# Patient Record
Sex: FEMALE | Race: WHITE | NOT HISPANIC OR LATINO
[De-identification: names, ages, dates, MRNs, and addresses within clinical notes are randomized per-mention and may not be internally consistent; named-entity substitution may affect disease eponyms.]

---

## 2018-06-12 ENCOUNTER — RESULT REVIEW (OUTPATIENT)
Age: 53
End: 2018-06-12

## 2019-06-18 ENCOUNTER — RESULT REVIEW (OUTPATIENT)
Age: 54
End: 2019-06-18

## 2020-06-19 ENCOUNTER — RESULT REVIEW (OUTPATIENT)
Age: 55
End: 2020-06-19

## 2020-10-12 ENCOUNTER — OUTPATIENT (OUTPATIENT)
Dept: OUTPATIENT SERVICES | Facility: HOSPITAL | Age: 55
LOS: 1 days | End: 2020-10-12
Payer: COMMERCIAL

## 2020-10-12 DIAGNOSIS — Z01.818 ENCOUNTER FOR OTHER PREPROCEDURAL EXAMINATION: ICD-10-CM

## 2020-10-12 PROBLEM — Z00.00 ENCOUNTER FOR PREVENTIVE HEALTH EXAMINATION: Status: ACTIVE | Noted: 2020-10-12

## 2020-10-12 LAB
ALBUMIN SERPL ELPH-MCNC: 4.2 G/DL — SIGNIFICANT CHANGE UP (ref 3.3–5.2)
ALP SERPL-CCNC: 67 U/L — SIGNIFICANT CHANGE UP (ref 40–120)
ALT FLD-CCNC: 13 U/L — SIGNIFICANT CHANGE UP
ANION GAP SERPL CALC-SCNC: 10 MMOL/L — SIGNIFICANT CHANGE UP (ref 5–17)
APTT BLD: 35.2 SEC — SIGNIFICANT CHANGE UP (ref 27.5–35.5)
AST SERPL-CCNC: 19 U/L — SIGNIFICANT CHANGE UP
BASOPHILS # BLD AUTO: 0.05 K/UL — SIGNIFICANT CHANGE UP (ref 0–0.2)
BASOPHILS NFR BLD AUTO: 0.6 % — SIGNIFICANT CHANGE UP (ref 0–2)
BILIRUB SERPL-MCNC: <0.2 MG/DL — LOW (ref 0.4–2)
BUN SERPL-MCNC: 15 MG/DL — SIGNIFICANT CHANGE UP (ref 8–20)
CALCIUM SERPL-MCNC: 9.6 MG/DL — SIGNIFICANT CHANGE UP (ref 8.6–10.2)
CHLORIDE SERPL-SCNC: 99 MMOL/L — SIGNIFICANT CHANGE UP (ref 98–107)
CO2 SERPL-SCNC: 27 MMOL/L — SIGNIFICANT CHANGE UP (ref 22–29)
CREAT SERPL-MCNC: 0.79 MG/DL — SIGNIFICANT CHANGE UP (ref 0.5–1.3)
EOSINOPHIL # BLD AUTO: 0.12 K/UL — SIGNIFICANT CHANGE UP (ref 0–0.5)
EOSINOPHIL NFR BLD AUTO: 1.5 % — SIGNIFICANT CHANGE UP (ref 0–6)
GLUCOSE SERPL-MCNC: 101 MG/DL — HIGH (ref 70–99)
HCT VFR BLD CALC: 42 % — SIGNIFICANT CHANGE UP (ref 34.5–45)
HGB BLD-MCNC: 12.5 G/DL — SIGNIFICANT CHANGE UP (ref 11.5–15.5)
IMM GRANULOCYTES NFR BLD AUTO: 0.2 % — SIGNIFICANT CHANGE UP (ref 0–1.5)
INR BLD: 1.15 RATIO — SIGNIFICANT CHANGE UP (ref 0.88–1.16)
LYMPHOCYTES # BLD AUTO: 1.8 K/UL — SIGNIFICANT CHANGE UP (ref 1–3.3)
LYMPHOCYTES # BLD AUTO: 22 % — SIGNIFICANT CHANGE UP (ref 13–44)
MCHC RBC-ENTMCNC: 23.2 PG — LOW (ref 27–34)
MCHC RBC-ENTMCNC: 29.8 GM/DL — LOW (ref 32–36)
MCV RBC AUTO: 78.1 FL — LOW (ref 80–100)
MONOCYTES # BLD AUTO: 0.5 K/UL — SIGNIFICANT CHANGE UP (ref 0–0.9)
MONOCYTES NFR BLD AUTO: 6.1 % — SIGNIFICANT CHANGE UP (ref 2–14)
NEUTROPHILS # BLD AUTO: 5.68 K/UL — SIGNIFICANT CHANGE UP (ref 1.8–7.4)
NEUTROPHILS NFR BLD AUTO: 69.6 % — SIGNIFICANT CHANGE UP (ref 43–77)
PLATELET # BLD AUTO: 280 K/UL — SIGNIFICANT CHANGE UP (ref 150–400)
POTASSIUM SERPL-MCNC: 4.3 MMOL/L — SIGNIFICANT CHANGE UP (ref 3.5–5.3)
POTASSIUM SERPL-SCNC: 4.3 MMOL/L — SIGNIFICANT CHANGE UP (ref 3.5–5.3)
PROT SERPL-MCNC: 7.9 G/DL — SIGNIFICANT CHANGE UP (ref 6.6–8.7)
PROTHROM AB SERPL-ACNC: 13.3 SEC — SIGNIFICANT CHANGE UP (ref 10.6–13.6)
RBC # BLD: 5.38 M/UL — HIGH (ref 3.8–5.2)
RBC # FLD: 14.9 % — HIGH (ref 10.3–14.5)
SODIUM SERPL-SCNC: 136 MMOL/L — SIGNIFICANT CHANGE UP (ref 135–145)
WBC # BLD: 8.17 K/UL — SIGNIFICANT CHANGE UP (ref 3.8–10.5)
WBC # FLD AUTO: 8.17 K/UL — SIGNIFICANT CHANGE UP (ref 3.8–10.5)

## 2020-10-12 PROCEDURE — 93005 ELECTROCARDIOGRAM TRACING: CPT

## 2020-10-12 PROCEDURE — 85610 PROTHROMBIN TIME: CPT

## 2020-10-12 PROCEDURE — 80053 COMPREHEN METABOLIC PANEL: CPT

## 2020-10-12 PROCEDURE — G0463: CPT

## 2020-10-12 PROCEDURE — 36415 COLL VENOUS BLD VENIPUNCTURE: CPT

## 2020-10-12 PROCEDURE — 85025 COMPLETE CBC W/AUTO DIFF WBC: CPT

## 2020-10-12 PROCEDURE — 85730 THROMBOPLASTIN TIME PARTIAL: CPT

## 2020-10-12 PROCEDURE — 93010 ELECTROCARDIOGRAM REPORT: CPT

## 2020-10-17 DIAGNOSIS — Z01.818 ENCOUNTER FOR OTHER PREPROCEDURAL EXAMINATION: ICD-10-CM

## 2020-10-19 ENCOUNTER — APPOINTMENT (OUTPATIENT)
Dept: DISASTER EMERGENCY | Facility: CLINIC | Age: 55
End: 2020-10-19

## 2020-10-20 LAB — SARS-COV-2 N GENE NPH QL NAA+PROBE: NOT DETECTED

## 2020-10-23 ENCOUNTER — APPOINTMENT (OUTPATIENT)
Dept: DISASTER EMERGENCY | Facility: CLINIC | Age: 55
End: 2020-10-23

## 2020-10-24 LAB — SARS-COV-2 N GENE NPH QL NAA+PROBE: NOT DETECTED

## 2021-07-02 ENCOUNTER — RESULT REVIEW (OUTPATIENT)
Age: 56
End: 2021-07-02

## 2021-12-14 ENCOUNTER — RESULT REVIEW (OUTPATIENT)
Age: 56
End: 2021-12-14

## 2022-01-11 ENCOUNTER — NON-APPOINTMENT (OUTPATIENT)
Age: 57
End: 2022-01-11

## 2022-01-12 ENCOUNTER — FORM ENCOUNTER (OUTPATIENT)
Age: 57
End: 2022-01-12

## 2022-01-13 ENCOUNTER — NON-APPOINTMENT (OUTPATIENT)
Age: 57
End: 2022-01-13

## 2022-01-13 ENCOUNTER — APPOINTMENT (OUTPATIENT)
Dept: GYNECOLOGIC ONCOLOGY | Facility: CLINIC | Age: 57
End: 2022-01-13
Payer: COMMERCIAL

## 2022-01-13 PROCEDURE — 76830 TRANSVAGINAL US NON-OB: CPT | Mod: 59

## 2022-01-13 PROCEDURE — 99205 OFFICE O/P NEW HI 60 MIN: CPT | Mod: 25

## 2022-01-13 PROCEDURE — 76857 US EXAM PELVIC LIMITED: CPT | Mod: 59

## 2022-01-18 NOTE — HISTORY OF PRESENT ILLNESS
[FreeTextEntry1] : 57yo  LMP unknown. Patient reports irregular bleeding over the past year. She underwent EMC on 21 with Dr. Lula Horne with pathology report revealing endometrial adenocarcinoma FIGO2 vs. high grade. She denies any pelvic pain or issues with bowel/bladder habits. \par \par LPAP- 2021, normal\par LMammo- , reported normal\par LColonoscopy- never\par LBone Density Scan- never\par

## 2022-01-18 NOTE — PHYSICAL EXAM
[Normal] : Bimanual Exam: Normal [de-identified] : Patient was interviewed and examined with chaperone present. Name of Chaperone: Rubina Haji PA-C

## 2022-01-18 NOTE — END OF VISIT
[FreeTextEntry3] : Written by Krystal Haji PA-C, acting as a scribe for Dr. Canelo Horne.\par  [FreeTextEntry2] : This note accurately reflects the work and decisions made by me.\par

## 2022-01-18 NOTE — ASSESSMENT
[FreeTextEntry1] : 55yo with endometrial adenocarcinoma.\par \par I discussed at length with the patient the nature, purpose, risks, benefits, and alternatives of Robot assisted total laparoscopic hysterectomy with bilateral salpingo-oophorectomy sentinel lymph node mapping cystoscopy and possible retroperitoneal lymphadenectomy and staging (including possible omentectomy).  The patient understands the risks to include,but not be limited to, bowel injury, bleeding (with the possible need for transfusion), bladder or ureteral injury, infections, deep venous thrombosis, and srikanth-operative death.  The patient understands the utility of intraoperative frozen section to determine whether surgical staging will be performed. The patient also understands that her surgery may not be able to be performed robotically and that she may need a laparotomy.  She also understands the limitations of robotic surgery and the possibility of missing a surgical complication with need for subsequent re-exploration.  She agrees to proceed.  She asked numerous questions which were answered to her satisfaction.  She understands the need for a pre-operative bowel preparation and agrees to comply with our instructions.  She also understands the rationale for a cystoscopy at the completion of the procedure and the potential risks of cystoscopy.  The patient also understands the possible limitations of frozen section and the limitations of robotic staging compared to a laparotomy approach.\par \par \par \par

## 2022-01-18 NOTE — CHIEF COMPLAINT
[FreeTextEntry1] : [facility]\par \par [facility] [phone] at [address] [place] [id number]\par

## 2022-01-18 NOTE — LETTER BODY
[FreeTextEntry2] : Name: SHANIQUA JUAREZ \par : 1965 \par \par Date of Visit: 2022 \par \par Dear Dr. Lula Horne\par \par I recently saw our mutual patient, SHANIQUA JUAREZ , in my office.\par \par Below, please see my findings.\par \par As always, it is my sincere pleasure to have the opportunity to participate in one of your patients' care. Please feel free to contact me with any questions or concerns that you may have regarding her care.\par \par Sincerely yours,\par \par Canelo Horne MD, FACOG, FACS\par

## 2022-01-19 ENCOUNTER — RESULT REVIEW (OUTPATIENT)
Age: 57
End: 2022-01-19

## 2022-01-19 ENCOUNTER — OUTPATIENT (OUTPATIENT)
Dept: OUTPATIENT SERVICES | Facility: HOSPITAL | Age: 57
LOS: 1 days | End: 2022-01-19
Payer: COMMERCIAL

## 2022-01-19 DIAGNOSIS — C54.1 MALIGNANT NEOPLASM OF ENDOMETRIUM: ICD-10-CM

## 2022-01-19 PROCEDURE — 88321 CONSLTJ&REPRT SLD PREP ELSWR: CPT

## 2022-01-20 ENCOUNTER — OUTPATIENT (OUTPATIENT)
Dept: OUTPATIENT SERVICES | Facility: HOSPITAL | Age: 57
LOS: 1 days | End: 2022-01-20
Payer: COMMERCIAL

## 2022-01-20 VITALS
OXYGEN SATURATION: 99 % | WEIGHT: 172.4 LBS | HEIGHT: 64 IN | TEMPERATURE: 97 F | SYSTOLIC BLOOD PRESSURE: 120 MMHG | RESPIRATION RATE: 18 BRPM | DIASTOLIC BLOOD PRESSURE: 80 MMHG | HEART RATE: 70 BPM

## 2022-01-20 DIAGNOSIS — C54.1 MALIGNANT NEOPLASM OF ENDOMETRIUM: ICD-10-CM

## 2022-01-20 DIAGNOSIS — Z29.9 ENCOUNTER FOR PROPHYLACTIC MEASURES, UNSPECIFIED: ICD-10-CM

## 2022-01-20 DIAGNOSIS — Z01.818 ENCOUNTER FOR OTHER PREPROCEDURAL EXAMINATION: ICD-10-CM

## 2022-01-20 DIAGNOSIS — Z98.890 OTHER SPECIFIED POSTPROCEDURAL STATES: Chronic | ICD-10-CM

## 2022-01-20 DIAGNOSIS — I44.7 LEFT BUNDLE-BRANCH BLOCK, UNSPECIFIED: ICD-10-CM

## 2022-01-20 LAB
A1C WITH ESTIMATED AVERAGE GLUCOSE RESULT: 5.6 % — SIGNIFICANT CHANGE UP (ref 4–5.6)
ANION GAP SERPL CALC-SCNC: 9 MMOL/L — SIGNIFICANT CHANGE UP (ref 5–17)
APTT BLD: 34.4 SEC — SIGNIFICANT CHANGE UP (ref 27.5–35.5)
BASOPHILS # BLD AUTO: 0.04 K/UL — SIGNIFICANT CHANGE UP (ref 0–0.2)
BASOPHILS NFR BLD AUTO: 0.7 % — SIGNIFICANT CHANGE UP (ref 0–2)
BLD GP AB SCN SERPL QL: SIGNIFICANT CHANGE UP
BUN SERPL-MCNC: 17.8 MG/DL — SIGNIFICANT CHANGE UP (ref 8–20)
CALCIUM SERPL-MCNC: 9.4 MG/DL — SIGNIFICANT CHANGE UP (ref 8.6–10.2)
CANCER AG125 SERPL-ACNC: 34 U/ML — SIGNIFICANT CHANGE UP
CHLORIDE SERPL-SCNC: 101 MMOL/L — SIGNIFICANT CHANGE UP (ref 98–107)
CO2 SERPL-SCNC: 26 MMOL/L — SIGNIFICANT CHANGE UP (ref 22–29)
CREAT SERPL-MCNC: 0.68 MG/DL — SIGNIFICANT CHANGE UP (ref 0.5–1.3)
EOSINOPHIL # BLD AUTO: 0.1 K/UL — SIGNIFICANT CHANGE UP (ref 0–0.5)
EOSINOPHIL NFR BLD AUTO: 1.7 % — SIGNIFICANT CHANGE UP (ref 0–6)
ESTIMATED AVERAGE GLUCOSE: 114 MG/DL — SIGNIFICANT CHANGE UP (ref 68–114)
GLUCOSE SERPL-MCNC: 105 MG/DL — HIGH (ref 70–99)
HCG SERPL-ACNC: <4 MIU/ML — SIGNIFICANT CHANGE UP
HCT VFR BLD CALC: 42.8 % — SIGNIFICANT CHANGE UP (ref 34.5–45)
HGB BLD-MCNC: 13.4 G/DL — SIGNIFICANT CHANGE UP (ref 11.5–15.5)
IMM GRANULOCYTES NFR BLD AUTO: 0.3 % — SIGNIFICANT CHANGE UP (ref 0–1.5)
INR BLD: 1.15 RATIO — SIGNIFICANT CHANGE UP (ref 0.88–1.16)
LYMPHOCYTES # BLD AUTO: 1.83 K/UL — SIGNIFICANT CHANGE UP (ref 1–3.3)
LYMPHOCYTES # BLD AUTO: 31.5 % — SIGNIFICANT CHANGE UP (ref 13–44)
MCHC RBC-ENTMCNC: 25.4 PG — LOW (ref 27–34)
MCHC RBC-ENTMCNC: 31.3 GM/DL — LOW (ref 32–36)
MCV RBC AUTO: 81.2 FL — SIGNIFICANT CHANGE UP (ref 80–100)
MONOCYTES # BLD AUTO: 0.41 K/UL — SIGNIFICANT CHANGE UP (ref 0–0.9)
MONOCYTES NFR BLD AUTO: 7.1 % — SIGNIFICANT CHANGE UP (ref 2–14)
NEUTROPHILS # BLD AUTO: 3.41 K/UL — SIGNIFICANT CHANGE UP (ref 1.8–7.4)
NEUTROPHILS NFR BLD AUTO: 58.7 % — SIGNIFICANT CHANGE UP (ref 43–77)
PLATELET # BLD AUTO: 271 K/UL — SIGNIFICANT CHANGE UP (ref 150–400)
POTASSIUM SERPL-MCNC: 4.8 MMOL/L — SIGNIFICANT CHANGE UP (ref 3.5–5.3)
POTASSIUM SERPL-SCNC: 4.8 MMOL/L — SIGNIFICANT CHANGE UP (ref 3.5–5.3)
PROTHROM AB SERPL-ACNC: 13.3 SEC — SIGNIFICANT CHANGE UP (ref 10.6–13.6)
RBC # BLD: 5.27 M/UL — HIGH (ref 3.8–5.2)
RBC # FLD: 14.6 % — HIGH (ref 10.3–14.5)
SARS-COV-2 RNA SPEC QL NAA+PROBE: DETECTED
SODIUM SERPL-SCNC: 136 MMOL/L — SIGNIFICANT CHANGE UP (ref 135–145)
SURGICAL PATHOLOGY STUDY: SIGNIFICANT CHANGE UP
WBC # BLD: 5.81 K/UL — SIGNIFICANT CHANGE UP (ref 3.8–10.5)
WBC # FLD AUTO: 5.81 K/UL — SIGNIFICANT CHANGE UP (ref 3.8–10.5)

## 2022-01-20 PROCEDURE — 93010 ELECTROCARDIOGRAM REPORT: CPT

## 2022-01-20 PROCEDURE — 93005 ELECTROCARDIOGRAM TRACING: CPT

## 2022-01-20 PROCEDURE — G0463: CPT

## 2022-01-20 RX ORDER — SODIUM CHLORIDE 9 MG/ML
3 INJECTION INTRAMUSCULAR; INTRAVENOUS; SUBCUTANEOUS ONCE
Refills: 0 | Status: DISCONTINUED | OUTPATIENT
Start: 2022-02-08 | End: 2022-02-08

## 2022-01-20 RX ORDER — METRONIDAZOLE 500 MG
500 TABLET ORAL ONCE
Refills: 0 | Status: COMPLETED | OUTPATIENT
Start: 2022-02-08 | End: 2022-02-08

## 2022-01-20 RX ORDER — ACETAMINOPHEN 500 MG
975 TABLET ORAL ONCE
Refills: 0 | Status: COMPLETED | OUTPATIENT
Start: 2022-02-08 | End: 2022-02-08

## 2022-01-20 RX ORDER — CEFAZOLIN SODIUM 1 G
2000 VIAL (EA) INJECTION ONCE
Refills: 0 | Status: COMPLETED | OUTPATIENT
Start: 2022-02-08 | End: 2022-02-08

## 2022-01-20 NOTE — H&P PST ADULT - ASSESSMENT
CAPRINI SCORE    AGE RELATED RISK FACTORS                                                             [x ] Age 41-60 years                                            (1 Point)  [ ] Age: 61-74 years                                           (2 Points)                 [ ] Age= 75 years                                                (3 Points)             DISEASE RELATED RISK FACTORS                                                       [ ] Edema in the lower extremities                 (1 Point)                     [ ] Varicose veins                                               (1 Point)                                 [x ] BMI > 25 Kg/m2                                            (1 Point)                                  [ ] Serious infection (ie PNA)                            (1 Point)                     [ ] Lung disease ( COPD, Emphysema)            (1 Point)                                                                          [ ] Acute myocardial infarction                         (1 Point)                  [ ] Congestive heart failure (in the previous month)  (1 Point)         [ ] Inflammatory bowel disease                            (1 Point)                  [ ] Central venous access, PICC or Port               (2 points)       (within the last month)                                                                [ ] Stroke (in the previous month)                        (5 Points)    [ x] Previous or present malignancy                       (2 points)                                                                                                                                                         HEMATOLOGY RELATED FACTORS                                                         [ ] Prior episodes of VTE                                     (3 Points)                     [x ] Positive family history for VTE                      (3 Points)                  [ ] Prothrombin 76190 A                                     (3 Points)                     [ ] Factor V Leiden                                                (3 Points)                        [ ] Lupus anticoagulants                                      (3 Points)                                                           [ ] Anticardiolipin antibodies                              (3 Points)                                                       [ ] High homocysteine in the blood                   (3 Points)                                             [ ] Other congenital or acquired thrombophilia      (3 Points)                                                [ ] Heparin induced thrombocytopenia                  (3 Points)                                        MOBILITY RELATED FACTORS  [ ] Bed rest                                                         (1 Point)  [ ] Plaster cast                                                    (2 points)  [ ] Bed bound for more than 72 hours           (2 Points)    GENDER SPECIFIC FACTORS  [ ] Pregnancy or had a baby within the last month   (1 Point)  [ ] Post-partum < 6 weeks                                   (1 Point)  [ ] Hormonal therapy  or oral contraception   (1 Point)  [ ] History of pregnancy complications              (1 point)  [ ] Unexplained or recurrent              (1 Point)    OTHER RISK FACTORS                                           (1 Point)  [ ] BMI >40, smoking, diabetes requiring insulin, chemotherapy  blood transfusions and length of surgery over 2 hours    SURGERY RELATED RISK FACTORS  [ ]  Section within the last month     (1 Point)  [ ] Minor surgery                                                  (1 Point)  [ ] Arthroscopic surgery                                       (2 Points)  [ x] Planned major surgery lasting more            (2 Points)      than 45 minutes     [ ] Elective hip or knee joint replacement       (5 points)       surgery                                                TRAUMA RELATED RISK FACTORS  [ ] Fracture of the hip, pelvis, or leg                       (5 Points)  [ ] Spinal cord injury resulting in paralysis             (5 points)       (in the previous month)    [ ] Paralysis  (less than 1 month)                             (5 Points)  [ ] Multiple Trauma within 1 month                        (5 Points)    Total Score [  9     ]    Caprini Score 0-2: Low Risk, NO VTE prophylaxis required for most patients, encourage ambulation  Caprini Score 3-6: Moderate Risk , pharmacologic VTE prophylaxis is indicated for most patients (in the absence of contraindications)  Caprini Score Greater than or =7: High risk, pharmocologic VTE prophylaxis indicated for most patients (in the absence of contraindications)              OPIOID RISK TOOL    JEOVANNY EACH BOX THAT APPLIES AND ADD TOTALS AT THE END    FAMILY HISTORY OF SUBSTANCE ABUSE                 FEMALE         MALE                                                Alcohol                             [  ]1 pt          [  ]3pts                                               Illegal Durgs                     [  ]2 pts        [  ]3pts                                               Rx Drugs                           [  ]4 pts        [  ]4 pts    PERSONAL HISTORY OF SUBSTANCE ABUSE                                                                                          Alcohol                             [  ]3 pts       [  ]3 pts                                               Illegal Durgs                     [  ]4 pts        [  ]4 pts                                               Rx Drugs                           [  ]5 pts        [  ]5 pts    AGE BETWEEN 16-45 YEARS                                      [  ]1 pt         [  ]1 pt    HISTORY OF PREADOLESCENT   SEXUAL ABUSE                                                             [  ]3 pts        [  ]0pts    PSYCHOLOGICAL DISEASE                     ADD, OCD, Bipolar, Schizophrenia        [  ]2 pts         [  ]2 pts                      Depression                                               [  ]1 pt           [  ]1 pt           SCORING TOTAL   (add numbers and type here)              (***)                                     A score of 3 or lower indicated LOW risk for future opiod abuse  A score of 4 to 7 indicated moderate risk for future opiod abuse  A score of 8 or higher indicates a high risk for opiod abuse CAPRINI SCORE    AGE RELATED RISK FACTORS                                                             [x ] Age 41-60 years                                            (1 Point)  [ ] Age: 61-74 years                                           (2 Points)                 [ ] Age= 75 years                                                (3 Points)             DISEASE RELATED RISK FACTORS                                                       [ ] Edema in the lower extremities                 (1 Point)                     [ ] Varicose veins                                               (1 Point)                                 [x ] BMI > 25 Kg/m2                                            (1 Point)                                  [ ] Serious infection (ie PNA)                            (1 Point)                     [ ] Lung disease ( COPD, Emphysema)            (1 Point)                                                                          [ ] Acute myocardial infarction                         (1 Point)                  [ ] Congestive heart failure (in the previous month)  (1 Point)         [ ] Inflammatory bowel disease                            (1 Point)                  [ ] Central venous access, PICC or Port               (2 points)       (within the last month)                                                                [ ] Stroke (in the previous month)                        (5 Points)    [ x] Previous or present malignancy                       (2 points)                                                                                                                                                         HEMATOLOGY RELATED FACTORS                                                         [ ] Prior episodes of VTE                                     (3 Points)                     [x ] Positive family history for VTE                      (3 Points)                  [ ] Prothrombin 39545 A                                     (3 Points)                     [ ] Factor V Leiden                                                (3 Points)                        [ ] Lupus anticoagulants                                      (3 Points)                                                           [ ] Anticardiolipin antibodies                              (3 Points)                                                       [ ] High homocysteine in the blood                   (3 Points)                                             [ ] Other congenital or acquired thrombophilia      (3 Points)                                                [ ] Heparin induced thrombocytopenia                  (3 Points)                                        MOBILITY RELATED FACTORS  [ ] Bed rest                                                         (1 Point)  [ ] Plaster cast                                                    (2 points)  [ ] Bed bound for more than 72 hours           (2 Points)    GENDER SPECIFIC FACTORS  [ ] Pregnancy or had a baby within the last month   (1 Point)  [ ] Post-partum < 6 weeks                                   (1 Point)  [ ] Hormonal therapy  or oral contraception   (1 Point)  [ ] History of pregnancy complications              (1 point)  [ ] Unexplained or recurrent              (1 Point)    OTHER RISK FACTORS                                           (1 Point)  [ ] BMI >40, smoking, diabetes requiring insulin, chemotherapy  blood transfusions and length of surgery over 2 hours    SURGERY RELATED RISK FACTORS  [ ]  Section within the last month     (1 Point)  [ ] Minor surgery                                                  (1 Point)  [ ] Arthroscopic surgery                                       (2 Points)  [ x] Planned major surgery lasting more            (2 Points)      than 45 minutes     [ ] Elective hip or knee joint replacement       (5 points)       surgery                                                TRAUMA RELATED RISK FACTORS  [ ] Fracture of the hip, pelvis, or leg                       (5 Points)  [ ] Spinal cord injury resulting in paralysis             (5 points)       (in the previous month)    [ ] Paralysis  (less than 1 month)                             (5 Points)  [ ] Multiple Trauma within 1 month                        (5 Points)    Total Score [  9     ]    Caprini Score 0-2: Low Risk, NO VTE prophylaxis required for most patients, encourage ambulation  Caprini Score 3-6: Moderate Risk , pharmacologic VTE prophylaxis is indicated for most patients (in the absence of contraindications)  Caprini Score Greater than or =7: High risk, pharmocologic VTE prophylaxis indicated for most patients (in the absence of contraindications)              OPIOID RISK TOOL    JEOVANNY EACH BOX THAT APPLIES AND ADD TOTALS AT THE END    FAMILY HISTORY OF SUBSTANCE ABUSE                 FEMALE         MALE                                                Alcohol                             [  ]1 pt          [ x ]3pts                                               Illegal Durgs                     [  ]2 pts        [  ]3pts                                               Rx Drugs                           [  ]4 pts        [  ]4 pts    PERSONAL HISTORY OF SUBSTANCE ABUSE                                                                                          Alcohol                             [  ]3 pts       [  ]3 pts                                               Illegal Durgs                     [  ]4 pts        [  ]4 pts                                               Rx Drugs                           [  ]5 pts        [  ]5 pts    AGE BETWEEN 16-45 YEARS                                      [  ]1 pt         [  ]1 pt    HISTORY OF PREADOLESCENT   SEXUAL ABUSE                                                             [  ]3 pts        [  ]0pts    PSYCHOLOGICAL DISEASE                     ADD, OCD, Bipolar, Schizophrenia        [  ]2 pts         [  ]2 pts                      Depression                                               [  ]1 pt           [  ]1 pt           SCORING TOTAL   3                                    A score of 3 or lower indicated LOW risk for future opiod abuse  A score of 4 to 7 indicated moderate risk for future opiod abuse  A score of 8 or higher indicates a high risk for opiod abuse    56 year old female  female with a pmhx of D&C following spontaneous , LBBB pt had stress test in  which was normal as per patient, presents with persisted vaginal bleeding, states she is unsure when her LMP was, she reports bleeding lasts up to 6 weeks, describes as spotting, reports mild lower abdominal cramping that radiates to her lower back. She underwent EMC on 21 with Dr. Lula Horne with pathology report revealing endometrial adenocarcinoma FIGO2 vs. high grade. Abdomen is soft, tenderness to bilateral lower quadrants, + bowel sounds in all quadrants, no organomegaly. Patient is now scheduled for robotic assisted laparoscopic hysterectomy, bilateral salpingo oophorectomy, sentinel lymph node mapping, cystopscopy on 22. Patient educated on surgical scrub, COVID testing, preadmission instructions, medical clearance and day of procedure medications, verbalizes understanding. Pt instructed to stop vitamins/supplements/herbal medications/ASA/NSAIDS for one week prior to surgery and discuss with PMD.  CAPRINI SCORE    AGE RELATED RISK FACTORS                                                             [x ] Age 41-60 years                                            (1 Point)  [ ] Age: 61-74 years                                           (2 Points)                 [ ] Age= 75 years                                                (3 Points)             DISEASE RELATED RISK FACTORS                                                       [ ] Edema in the lower extremities                 (1 Point)                     [ ] Varicose veins                                               (1 Point)                                 [x ] BMI > 25 Kg/m2                                            (1 Point)                                  [ ] Serious infection (ie PNA)                            (1 Point)                     [ ] Lung disease ( COPD, Emphysema)            (1 Point)                                                                          [ ] Acute myocardial infarction                         (1 Point)                  [ ] Congestive heart failure (in the previous month)  (1 Point)         [ ] Inflammatory bowel disease                            (1 Point)                  [ ] Central venous access, PICC or Port               (2 points)       (within the last month)                                                                [ ] Stroke (in the previous month)                        (5 Points)    [ x] Previous or present malignancy                       (2 points)                                                                                                                                                         HEMATOLOGY RELATED FACTORS                                                         [ ] Prior episodes of VTE                                     (3 Points)                     [x ] Positive family history for VTE                      (3 Points)                  [ ] Prothrombin 29971 A                                     (3 Points)                     [ ] Factor V Leiden                                                (3 Points)                        [ ] Lupus anticoagulants                                      (3 Points)                                                           [ ] Anticardiolipin antibodies                              (3 Points)                                                       [ ] High homocysteine in the blood                   (3 Points)                                             [ ] Other congenital or acquired thrombophilia      (3 Points)                                                [ ] Heparin induced thrombocytopenia                  (3 Points)                                        MOBILITY RELATED FACTORS  [ ] Bed rest                                                         (1 Point)  [ ] Plaster cast                                                    (2 points)  [ ] Bed bound for more than 72 hours           (2 Points)    GENDER SPECIFIC FACTORS  [ ] Pregnancy or had a baby within the last month   (1 Point)  [ ] Post-partum < 6 weeks                                   (1 Point)  [ ] Hormonal therapy  or oral contraception   (1 Point)  [ ] History of pregnancy complications              (1 point)  [ ] Unexplained or recurrent              (1 Point)    OTHER RISK FACTORS                                           (1 Point)  [ ] BMI >40, smoking, diabetes requiring insulin, chemotherapy  blood transfusions and length of surgery over 2 hours    SURGERY RELATED RISK FACTORS  [ ]  Section within the last month     (1 Point)  [ ] Minor surgery                                                  (1 Point)  [ ] Arthroscopic surgery                                       (2 Points)  [ x] Planned major surgery lasting more            (2 Points)      than 45 minutes     [ ] Elective hip or knee joint replacement       (5 points)       surgery                                                TRAUMA RELATED RISK FACTORS  [ ] Fracture of the hip, pelvis, or leg                       (5 Points)  [ ] Spinal cord injury resulting in paralysis             (5 points)       (in the previous month)    [ ] Paralysis  (less than 1 month)                             (5 Points)  [ ] Multiple Trauma within 1 month                        (5 Points)    Total Score [  9     ]    Caprini Score 0-2: Low Risk, NO VTE prophylaxis required for most patients, encourage ambulation  Caprini Score 3-6: Moderate Risk , pharmacologic VTE prophylaxis is indicated for most patients (in the absence of contraindications)  Caprini Score Greater than or =7: High risk, pharmocologic VTE prophylaxis indicated for most patients (in the absence of contraindications)              OPIOID RISK TOOL    JEOVANNY EACH BOX THAT APPLIES AND ADD TOTALS AT THE END    FAMILY HISTORY OF SUBSTANCE ABUSE                 FEMALE         MALE                                                Alcohol                             [  ]1 pt          [ x ]3pts                                               Illegal Durgs                     [  ]2 pts        [  ]3pts                                               Rx Drugs                           [  ]4 pts        [  ]4 pts    PERSONAL HISTORY OF SUBSTANCE ABUSE                                                                                          Alcohol                             [  ]3 pts       [  ]3 pts                                               Illegal Durgs                     [  ]4 pts        [  ]4 pts                                               Rx Drugs                           [  ]5 pts        [  ]5 pts    AGE BETWEEN 16-45 YEARS                                      [  ]1 pt         [  ]1 pt    HISTORY OF PREADOLESCENT   SEXUAL ABUSE                                                             [  ]3 pts        [  ]0pts    PSYCHOLOGICAL DISEASE                     ADD, OCD, Bipolar, Schizophrenia        [  ]2 pts         [  ]2 pts                      Depression                                               [  ]1 pt           [  ]1 pt           SCORING TOTAL   3                                    A score of 3 or lower indicated LOW risk for future opiod abuse  A score of 4 to 7 indicated moderate risk for future opiod abuse  A score of 8 or higher indicates a high risk for opiod abuse    56 year old female  female with a pmhx of D&C following spontaneous , LBBB pt had stress test in  which was normal as per patient, presents with persisted vaginal bleeding, states she is unsure when her LMP was, she reports bleeding lasts up to 6 weeks, describes as spotting, reports mild lower abdominal cramping that radiates to her lower back. She underwent EMC on 21 with Dr. Lula Horne with pathology report revealing endometrial adenocarcinoma FIGO2 vs. high grade. Abdomen is soft, tenderness to bilateral lower quadrants, + bowel sounds in all quadrants, no organomegaly. Patient is now scheduled for robotic assisted laparoscopic hysterectomy, bilateral salpingo oophorectomy, sentinel lymph node mapping, cystopscopy on 22. Patient educated on surgical scrub, COVID testing, preadmission instructions, medical, cardiac clearance and day of procedure medications, verbalizes understanding. Pt instructed to stop vitamins/supplements/herbal medications/ASA/NSAIDS for one week prior to surgery and discuss with PMD.

## 2022-01-20 NOTE — H&P PST ADULT - PROBLEM SELECTOR PLAN 1
Patient is now scheduled for robotic assisted laparoscopic hysterectomy, bilateral salpingo oophorectomy, sentinel lymph node mapping, cystopscopy on 2/1/22. Medical and cardiac clearance pending

## 2022-01-20 NOTE — H&P PST ADULT - HISTORY OF PRESENT ILLNESS
56 year old female with no significant medical history but strong family hx of heart disease who was found to have endometrial adenocarcinoma.  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 She deferred treatment until this time.  For [facility] to rule out coronary ischemia prior to her surgery.     Symptoms:        Angina (Class):        Ischemic Symptoms:     Heart Failure:        Systolic/Diastolic/Combined:        NYHA Class (within 2 weeks):     Assessment of LVEF:       EF: 55-60%       Assessed by: echo       Date: 10/19/2020    Prior Cardiac Interventions: N/A       PCI's:        CABG:     Noninvasive Testing:   Stress Test: Date:  10/20/20       Protocol: regadenoson       Symptoms: none       EKG Changes: none       Myocardial Imaging: small sized, mild severity, distal anterior, fixed defect       Risk Assessment: moderate    Echo:   EF 55-60%  mild LVH  paradoxical/dysynergic septal motion  trace MR  mild TR    Antianginal Therapies:        Beta Blockers:         Calcium Channel Blockers:        Long Acting Nitrates:        Ranexa:     Associated Risk Factors:        Cerebrovascular Disease: N/A       Chronic Lung Disease: N/A       Peripheral Arterial Disease: N/A       Chronic Kidney Disease (if yes, what is GFR): N/A       Uncontrolled Diabetes (if yes, what is HgbA1C or FBS): N/A       Poorly Controlled Hypertension (if yes, what is SBP): N/A       Morbid Obesity (if yes, what is BMI): N/A       History of Recent Ventricular Arrhythmia: N/A       Inability to Ambulate Safely: N/A       Need for Therapeutic Anticoagulation: N/A       Antiplatelet or Contrast Allergy: N/A 56 year old female with no significant medical history but strong family hx of heart disease who was found to have endometrial adenocarcinoma and needs pre-operative clearance.  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 She deferred treatment until this time, H&P PST from 1/20/2022(cancelled for COVID +). There has been no changes to since this document was completed, except where modified.  She is now  [facility] to rule out coronary ischemia prior to her surgery.     Symptoms: NONE       Angina (Class):        Ischemic Symptoms:     Heart Failure: NONE        Systolic/Diastolic/Combined:        NYHA Class (within 2 weeks):     Assessment of LVEF:       EF: 55-60%       Assessed by: echo       Date: 10/19/2020    Prior Cardiac Interventions: N/A       PCI's:        CABG:     Noninvasive Testing:   Stress Test: Date:  10/20/20       Protocol: regadenoson       Symptoms: none       EKG Changes: none       Myocardial Imaging: small sized, mild severity, distal anterior, fixed defect       Risk Assessment: moderate    Echo:   EF 55-60%  mild LVH  paradoxical/dysynergic septal motion  trace MR  mild TR    Antianginal Therapies:        Beta Blockers:         Calcium Channel Blockers:        Long Acting Nitrates:        Ranexa:     Associated Risk Factors:        Cerebrovascular Disease: N/A       Chronic Lung Disease: N/A       Peripheral Arterial Disease: N/A       Chronic Kidney Disease (if yes, what is GFR): N/A       Uncontrolled Diabetes (if yes, what is HgbA1C or FBS): N/A       Poorly Controlled Hypertension (if yes, what is SBP): N/A       Morbid Obesity (if yes, what is BMI): N/A       History of Recent Ventricular Arrhythmia: N/A       Inability to Ambulate Safely: N/A       Need for Therapeutic Anticoagulation: N/A       Antiplatelet or Contrast Allergy: N/A    Social History:        Marital:         Tobacco:        ETOH:        Caffeine:     ROS:  CONSTITUTIONAL: No weakness, fevers or chills  EYES/ENT: No visual changes;  No vertigo or throat pain   NECK: No pain or stiffness  RESPIRATORY: No cough, wheezing, hemoptysis; No shortness of breath  CARDIOVASCULAR: No chest pain or palpitations  GASTROINTESTINAL: No abdominal or epigastric pain. No nausea, vomiting, or hematemesis; No diarrhea or constipation. No melena or hematochezia.  GENITOURINARY: No dysuria, frequency or hematuria  NEUROLOGICAL: No numbness or weakness  SKIN: No itching, burning, rashes, or lesions   All other review of systems is negative unless indicated above    PHYSICAL EXAM:    Vital Signs Last 24 Hrs  T(C): --  T(F): --  HR: --  BP: --  BP(mean): --  RR: --  SpO2: --    GENERAL: Pt lying comfortably, NAD.  ENMT: PERRL, +EOMI.  NECK: soft, Supple, No JVD,   CHEST/LUNG: Clear to auscultatation bilaterally; No wheezing.  HEART: S1S2+, Regular rate and rhythm; No murmurs.  ABDOMEN: Soft, Nontender, Nondistended; Bowel sounds present.  MUSCULOSKELETAL: Normal range of motion.  SKIN: No rashes or lesions.  NEURO: AAOX3, no focal deficits, no motor r sensory loss.  PSYCH: normal mood.  VASCULAR:   Radial +2 R/+2 L  Femoral +2 R/+2 L  PT +2 R/+2 L  DP +2 R/+2 L    EKG:      56 year old female with no significant medical history but strong family hx of heart disease who was found to have endometrial adenocarcinoma and needs pre-operative clearance.  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 She deferred treatment until this time, H&P PST from 1/20/2022(cancelled for COVID +). There has been no changes to since this document was completed, except where modified.  She is now  [facility] to rule out coronary ischemia prior to her surgery.     Symptoms: NONE       Angina (Class):        Ischemic Symptoms:     Heart Failure: NONE        Systolic/Diastolic/Combined:        NYHA Class (within 2 weeks):     Assessment of LVEF:       EF: 55-60%       Assessed by: echo       Date: 10/19/2020    Prior Cardiac Interventions: N/A       PCI's:        CABG:     Noninvasive Testing:   Stress Test: Date:  10/20/20       Protocol: regadenoson       Symptoms: none       EKG Changes: none       Myocardial Imaging: small sized, mild severity, distal anterior, fixed defect       Risk Assessment: moderate    Echo:   EF 55-60%  mild LVH  paradoxical/dysynergic septal motion  trace MR  mild TR    Antianginal Therapies:        Beta Blockers:         Calcium Channel Blockers:        Long Acting Nitrates:        Ranexa:     Associated Risk Factors:        Cerebrovascular Disease: N/A       Chronic Lung Disease: N/A       Peripheral Arterial Disease: N/A       Chronic Kidney Disease (if yes, what is GFR): N/A       Uncontrolled Diabetes (if yes, what is HgbA1C or FBS): N/A       Poorly Controlled Hypertension (if yes, what is SBP): N/A       Morbid Obesity (if yes, what is BMI): N/A       History of Recent Ventricular Arrhythmia: N/A       Inability to Ambulate Safely: N/A       Need for Therapeutic Anticoagulation: N/A       Antiplatelet or Contrast Allergy: N/A    Social History:        Marital:         Tobacco: denies        ETOH: denies        Caffeine: 1 daily/coffee     ROS:  CONSTITUTIONAL: No weakness, fevers or chills  EYES/ENT: No visual changes;  No vertigo or throat pain   NECK: No pain or stiffness  RESPIRATORY: No cough, wheezing, hemoptysis; No shortness of breath  CARDIOVASCULAR: No chest pain or palpitations  GASTROINTESTINAL: No abdominal or epigastric pain. No nausea, vomiting, or hematemesis; No diarrhea or constipation. No melena or hematochezia.  GENITOURINARY: No dysuria, frequency or hematuria  NEUROLOGICAL: No numbness or weakness  SKIN: No itching, burning, rashes, or lesions   All other review of systems is negative unless indicated above    PHYSICAL EXAM:    T(C): 36.7 (02-01-22 @ 12:48), Max: 36.7 (02-01-22 @ 12:48)  T(F): 98.1 (02-01-22 @ 12:48), Max: 98.1 (02-01-22 @ 12:48)  HR: 91 (02-01-22 @ 12:48) (91 - 91)  BP: 142/89 (02-01-22 @ 12:48) (142/89 - 142/89)  RR: 16 (02-01-22 @ 12:48) (16 - 16)  SpO2: 98% (02-01-22 @ 12:48) (98% - 98%)      GENERAL: Pt lying comfortably, NAD.  ENMT: PERRL, +EOMI.  NECK: soft, Supple, No JVD,   CHEST/LUNG: Clear to auscultatation bilaterally; No wheezing.  HEART: S1S2+, Regular rate and rhythm; No murmurs.  ABDOMEN: Soft, Nontender, Nondistended; Bowel sounds present.  MUSCULOSKELETAL: Normal range of motion.  SKIN: No rashes or lesions.  NEURO: AAOX3, no focal deficits, no motor r sensory loss.  PSYCH: normal mood.  VASCULAR:   Radial +2 R/+2 L  Femoral +2 R/+2 L  PT +2 R/+2 L  DP +2 R/+2 L    EKG:      56 year old female with no significant medical history but strong family hx of heart disease who was found to have endometrial adenocarcinoma and needs pre-operative clearance.  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 She deferred treatment until this time, H&P PST from 1/20/2022(cancelled for COVID +). There has been no changes to since this document was completed, except where modified.  She is now  [facility] to rule out coronary ischemia prior to her surgery.     Symptoms: NONE       Angina (Class):        Ischemic Symptoms:     Heart Failure: NONE        Systolic/Diastolic/Combined:        NYHA Class (within 2 weeks):     Assessment of LVEF:       EF: 55-60%       Assessed by: echo       Date: 10/19/2020    Prior Cardiac Interventions: N/A       PCI's:        CABG:     Noninvasive Testing:   Stress Test: Date:  10/20/20       Protocol: regadenoson       Symptoms: none       EKG Changes: none       Myocardial Imaging: small sized, mild severity, distal anterior, fixed defect       Risk Assessment: moderate    Echo:   EF 55-60%  mild LVH  paradoxical/dysynergic septal motion  trace MR  mild TR    Antianginal Therapies:        Beta Blockers:         Calcium Channel Blockers:        Long Acting Nitrates:        Ranexa:     Associated Risk Factors:        Cerebrovascular Disease: N/A       Chronic Lung Disease: N/A       Peripheral Arterial Disease: N/A       Chronic Kidney Disease (if yes, what is GFR): N/A       Uncontrolled Diabetes (if yes, what is HgbA1C or FBS): N/A       Poorly Controlled Hypertension (if yes, what is SBP): N/A       Morbid Obesity (if yes, what is BMI): N/A       History of Recent Ventricular Arrhythmia: N/A       Inability to Ambulate Safely: N/A       Need for Therapeutic Anticoagulation: N/A       Antiplatelet or Contrast Allergy: N/A    Social History:        Marital:   lives with , currently employed  for [employer]        Tobacco: denies        ETOH: denies        Caffeine: 1 daily/coffee     ROS:  CONSTITUTIONAL: No weakness, fevers or chills  EYES/ENT: No visual changes;  No vertigo or throat pain   NECK: No pain or stiffness  RESPIRATORY: No cough, wheezing, hemoptysis; No shortness of breath  CARDIOVASCULAR: No chest pain or palpitations  GASTROINTESTINAL: No abdominal or epigastric pain. No nausea, vomiting, or hematemesis; No diarrhea or constipation. No melena or hematochezia.  GENITOURINARY: No dysuria, frequency or hematuria  NEUROLOGICAL: No numbness or weakness  SKIN: No itching, burning, rashes, or lesions   All other review of systems is negative unless indicated above    PHYSICAL EXAM:    T(C): 36.7 (02-01-22 @ 12:48), Max: 36.7 (02-01-22 @ 12:48)  T(F): 98.1 (02-01-22 @ 12:48), Max: 98.1 (02-01-22 @ 12:48)  HR: 91 (02-01-22 @ 12:48) (91 - 91)  BP: 142/89 (02-01-22 @ 12:48) (142/89 - 142/89)  RR: 16 (02-01-22 @ 12:48) (16 - 16)  SpO2: 98% (02-01-22 @ 12:48) (98% - 98%)      GENERAL: Pt lying comfortably, NAD.  ENMT: PERRL, +EOMI.  NECK: soft, Supple, No JVD,   CHEST/LUNG: Clear to auscultatation bilaterally; No wheezing.  HEART: S1S2+, Regular rate and rhythm; No murmurs.  ABDOMEN: Soft, Nontender, Nondistended; Bowel sounds present.  MUSCULOSKELETAL: Normal range of motion.  SKIN: No rashes or lesions.  NEURO: AAOX3, no focal deficits, no motor r sensory loss.  PSYCH: normal mood.  VASCULAR:   Radial +2 R/+2 L  Femoral +2 R/+2 L  PT +2 R/+2 L  DP +2 R/+2 L    EKG:

## 2022-01-20 NOTE — H&P PST ADULT - ASSESSMENT
56 year old female with endometrial adenocarcinoma who requires pre op clearance prior to hysterectomy d/t LBBB and abnormal stress test.     ASA  Mallampati  GFR  BRA Risk Assessments:  ASA: 2  Mallampati: 2  GFR:   Cr:  BRA:      Impression:    Plan:    -plan for *** via ***  -patient seen and examined  -confirmed appropriate NPO duration  -ECG and Labs reviewed  -Aspirin 81mg po pre-cath  -procedure discussed with patient; risks and benefits explained, questions answered  -consent obtained by attending IC   Risk Assessments:  ASA: 2  Mallampati: 2  GFR:   Cr:  BRA:      Impression: 56 year old female with no significant medical history but strong family hx of heart disease who was found to have endometrial adenocarcinoma and needs pre-operative clearance.  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 She deferred treatment until this time, H&P PST from 1/20/2022(cancelled for COVID +). There has been no changes to since this document was completed, except where modified.  She is now  [facility] to rule out coronary ischemia prior to her surgery.     Plan:    -plan for [facility] with Dr. Doty   -patient seen and examined  -confirmed appropriate NPO duration  -ECG and Labs reviewed  -Aspirin 81mg po pre-cath  -procedure discussed with patient; risks and benefits explained, questions answered  -consent obtained by attending IC   Risk Assessments:  ASA: 2  Mallampati: 2  GFR: 99  Cr: 0.66  BRA: 1.2      Impression: 56 year old female with no significant medical history but strong family hx of heart disease who was found to have endometrial adenocarcinoma and needs pre-operative clearance.  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 She deferred treatment until this time, H&P PST from 1/20/2022(cancelled for COVID +). There has been no changes to since this document was completed, except where modified.  She is now  [facility] to rule out coronary ischemia prior to her surgery.     Plan:    -plan for [facility] with Dr. Doty   -patient seen and examined  -confirmed appropriate NPO duration  -ECG and Labs reviewed  -Aspirin 81mg po pre-cath  -procedure discussed with patient; risks and benefits explained, questions answered  -consent obtained by attending IC

## 2022-01-20 NOTE — H&P PST ADULT - HISTORY OF PRESENT ILLNESS
56 year old female  female with a pmhx of D&C following spontaneous , LBBB pt had stress test in  which was normal as per patient, presents to PST today with complaints of   states unsure if she is in menopause, unsure when LMP was, reports episodes of 6 weeks of spotting, lower abdominal cramping that she describes as mild, pt reports she is still currently having symptoms . She underwent EMC on 21 with Dr. Lula Horne with pathology report revealing endometrial adenocarcinoma FIGO2 vs. high grade. She denies any pelvic pain or issues with bowel/bladder habits.     denies back pain, fever, chills, weight loss, irregular discharge.   Patient is now scheduled for robotic assisted laparoscopic hysterectomy, bilateral salpingo oophorectomy, sentinel lymph node mapping, cystopscopy on 22.  Medical and cardiac clearance is pending  56 year old female  female with a pmhx of D&C following spontaneous , LBBB pt had stress test in  which was normal as per patient, presents with persisted vaginal bleeding, states she is unsure when her LMP was, she reports bleeding lasts up to 6 weeks, describes as spotting, reports mild lower abdominal cramping that radiates to her lower back. She underwent EMC on 21 with Dr. Lula Horne with pathology report revealing endometrial adenocarcinoma FIGO2 vs. high grade. Today at PST she denies fever, chills, malaise, fatigue, weight loss, nausea, vomiting, diarrhea, constipation, or change in bowel habits. Patient is now scheduled for robotic assisted laparoscopic hysterectomy, bilateral salpingo oophorectomy, sentinel lymph node mapping, cystopscopy on 22.  Medical and cardiac clearance is pending

## 2022-01-20 NOTE — H&P PST ADULT - LAB RESULTS AND INTERPRETATION
labs pending +COVID, pt states 2 weeks ago she had cold symptoms at home test was +COVID, patient denies symptoms today at [facility], result to be faxed to PCP, Dr Horne's office made aware, ADARSH Love notified. Earl GILMORE-C

## 2022-01-20 NOTE — ASU PATIENT PROFILE, ADULT - FALL HARM RISK - UNIVERSAL INTERVENTIONS
Bed in lowest position, wheels locked, appropriate side rails in place/Call bell, personal items and telephone in reach/Instruct patient to call for assistance before getting out of bed or chair/Non-slip footwear when patient is out of bed/[place] to call system/Physically safe environment - no spills, clutter or unnecessary equipment/Purposeful Proactive Rounding/Room/bathroom lighting operational, light cord in reach

## 2022-01-20 NOTE — H&P PST ADULT - PROBLEM SELECTOR PLAN 3
Malampati IV, could not visualize soft palate, reports periodental disease, states front left tooth and two adjacent teeth are loose, on assessment teeth do not move. Anesthesia notified.

## 2022-01-20 NOTE — H&P PST ADULT - PROBLEM SELECTOR PLAN 4
Caprini Score 9, High risk,  Surgical team should assess /Strongly recommend pharmacological and mechanical measures for VTE prophylaxis

## 2022-01-20 NOTE — H&P PST ADULT - LYMPH NODES
5/2/2021 8:16 PM 
 
Ms. Prisca Millard [address] [place] [id number] Dear Kings Lara MD, 
 
I had the opportunity to see your patient, Prisca Millard, 2003, in the [facility] clinic. Please find my impression and suggestions attached. Feel free to call our office with any questions, [phone]. Sincerely, Marianne Whatley MD 
 
 No lymphadedenopathy

## 2022-01-20 NOTE — H&P PST ADULT - NSICDXPASTMEDICALHX_GEN_ALL_CORE_FT
PAST MEDICAL HISTORY:  Abnormal stress test     LBBB (left bundle branch block)     Malignant neoplasm of endometrium

## 2022-01-20 NOTE — H&P PST ADULT - PROBLEM SELECTOR PLAN 2
Hx of LBBB, pt is scheduled for cardiac cauterization tomorrow 1/21/22 at [facility], cardiac clearance with Dr Doty is pending.

## 2022-01-26 ENCOUNTER — APPOINTMENT (OUTPATIENT)
Dept: NUCLEAR MEDICINE | Facility: CLINIC | Age: 57
End: 2022-01-26
Payer: COMMERCIAL

## 2022-01-31 ENCOUNTER — FORM ENCOUNTER (OUTPATIENT)
Age: 57
End: 2022-01-31

## 2022-02-01 ENCOUNTER — TRANSCRIPTION ENCOUNTER (OUTPATIENT)
Age: 57
End: 2022-02-01

## 2022-02-01 ENCOUNTER — OUTPATIENT (OUTPATIENT)
Dept: OUTPATIENT SERVICES | Facility: HOSPITAL | Age: 57
LOS: 1 days | End: 2022-02-01
Payer: COMMERCIAL

## 2022-02-01 VITALS
DIASTOLIC BLOOD PRESSURE: 71 MMHG | RESPIRATION RATE: 16 BRPM | OXYGEN SATURATION: 96 % | SYSTOLIC BLOOD PRESSURE: 144 MMHG | HEART RATE: 92 BPM

## 2022-02-01 VITALS
RESPIRATION RATE: 16 BRPM | OXYGEN SATURATION: 98 % | HEART RATE: 91 BPM | DIASTOLIC BLOOD PRESSURE: 89 MMHG | SYSTOLIC BLOOD PRESSURE: 142 MMHG | TEMPERATURE: 98 F

## 2022-02-01 DIAGNOSIS — Z98.890 OTHER SPECIFIED POSTPROCEDURAL STATES: Chronic | ICD-10-CM

## 2022-02-01 DIAGNOSIS — R94.39 ABNORMAL RESULT OF OTHER CARDIOVASCULAR FUNCTION STUDY: ICD-10-CM

## 2022-02-01 LAB
A1C WITH ESTIMATED AVERAGE GLUCOSE RESULT: 5.9 % — HIGH (ref 4–5.6)
ANION GAP SERPL CALC-SCNC: 13 MMOL/L — SIGNIFICANT CHANGE UP (ref 5–17)
BUN SERPL-MCNC: 16.1 MG/DL — SIGNIFICANT CHANGE UP (ref 8–20)
CALCIUM SERPL-MCNC: 9.3 MG/DL — SIGNIFICANT CHANGE UP (ref 8.6–10.2)
CHLORIDE SERPL-SCNC: 101 MMOL/L — SIGNIFICANT CHANGE UP (ref 98–107)
CHOLEST SERPL-MCNC: 231 MG/DL — HIGH
CO2 SERPL-SCNC: 24 MMOL/L — SIGNIFICANT CHANGE UP (ref 22–29)
CREAT SERPL-MCNC: 0.66 MG/DL — SIGNIFICANT CHANGE UP (ref 0.5–1.3)
ESTIMATED AVERAGE GLUCOSE: 123 MG/DL — HIGH (ref 68–114)
GLUCOSE SERPL-MCNC: 99 MG/DL — SIGNIFICANT CHANGE UP (ref 70–99)
HCG SERPL-ACNC: <4 MIU/ML — SIGNIFICANT CHANGE UP
HCT VFR BLD CALC: 39.9 % — SIGNIFICANT CHANGE UP (ref 34.5–45)
HDLC SERPL-MCNC: 76 MG/DL — SIGNIFICANT CHANGE UP
HGB BLD-MCNC: 12.9 G/DL — SIGNIFICANT CHANGE UP (ref 11.5–15.5)
LIPID PNL WITH DIRECT LDL SERPL: 139 MG/DL — HIGH
MCHC RBC-ENTMCNC: 26.2 PG — LOW (ref 27–34)
MCHC RBC-ENTMCNC: 32.3 GM/DL — SIGNIFICANT CHANGE UP (ref 32–36)
MCV RBC AUTO: 80.9 FL — SIGNIFICANT CHANGE UP (ref 80–100)
NON HDL CHOLESTEROL: 155 MG/DL — HIGH
PLATELET # BLD AUTO: 337 K/UL — SIGNIFICANT CHANGE UP (ref 150–400)
POTASSIUM SERPL-MCNC: 4.4 MMOL/L — SIGNIFICANT CHANGE UP (ref 3.5–5.3)
POTASSIUM SERPL-SCNC: 4.4 MMOL/L — SIGNIFICANT CHANGE UP (ref 3.5–5.3)
RBC # BLD: 4.93 M/UL — SIGNIFICANT CHANGE UP (ref 3.8–5.2)
RBC # FLD: 14.6 % — HIGH (ref 10.3–14.5)
SODIUM SERPL-SCNC: 137 MMOL/L — SIGNIFICANT CHANGE UP (ref 135–145)
TRIGL SERPL-MCNC: 80 MG/DL — SIGNIFICANT CHANGE UP
WBC # BLD: 9.02 K/UL — SIGNIFICANT CHANGE UP (ref 3.8–10.5)
WBC # FLD AUTO: 9.02 K/UL — SIGNIFICANT CHANGE UP (ref 3.8–10.5)

## 2022-02-01 PROCEDURE — C1769: CPT

## 2022-02-01 PROCEDURE — C1894: CPT

## 2022-02-01 PROCEDURE — 85027 COMPLETE CBC AUTOMATED: CPT

## 2022-02-01 PROCEDURE — 84702 CHORIONIC GONADOTROPIN TEST: CPT

## 2022-02-01 PROCEDURE — 36415 COLL VENOUS BLD VENIPUNCTURE: CPT

## 2022-02-01 PROCEDURE — 93005 ELECTROCARDIOGRAM TRACING: CPT

## 2022-02-01 PROCEDURE — 80061 LIPID PANEL: CPT

## 2022-02-01 PROCEDURE — 93458 L HRT ARTERY/VENTRICLE ANGIO: CPT

## 2022-02-01 PROCEDURE — 83036 HEMOGLOBIN GLYCOSYLATED A1C: CPT

## 2022-02-01 PROCEDURE — 99152 MOD SED SAME PHYS/QHP 5/>YRS: CPT

## 2022-02-01 PROCEDURE — C1887: CPT

## 2022-02-01 PROCEDURE — 80048 BASIC METABOLIC PNL TOTAL CA: CPT

## 2022-02-01 PROCEDURE — 93010 ELECTROCARDIOGRAM REPORT: CPT

## 2022-02-01 RX ORDER — ASPIRIN/CALCIUM CARB/MAGNESIUM 324 MG
81 TABLET ORAL ONCE
Refills: 0 | Status: COMPLETED | OUTPATIENT
Start: 2022-02-01 | End: 2022-02-01

## 2022-02-01 RX ADMIN — Medication 81 MILLIGRAM(S): at 13:12

## 2022-02-01 NOTE — ASU PATIENT PROFILE, ADULT - FALL HARM RISK - UNIVERSAL INTERVENTIONS
Bed in lowest position, wheels locked, appropriate side rails in place/Call bell, personal items and telephone in reach/Instruct patient to call for assistance before getting out of bed or chair/Non-slip footwear when patient is out of bed/[place] to call system/Physically safe environment - no spills, clutter or unnecessary equipment/Purposeful Proactive Rounding/Room/bathroom lighting operational, light cord in reach

## 2022-02-01 NOTE — PROGRESS NOTE ADULT - ASSESSMENT
Patient is a 56y old  Female who presents with a chief complaint of Cardiac Cath (2022 13:48)    56y  Female is now s/p left heart catheterization via RRA approach with Dr. Doty, tolerated procedure well without complications. Arrived to recovery room in NAD/hemodynamically stable. Right radial band secured, no hematoma or bleeding, neurovascular intact.   Intraprocedurally: Pt rec'd IV sedation, Heparin 4,000 units IA, and Omnipaque 26ml.   Findings: NO obstructive CAD     < from: Cardiac Catheterization (22 @ 17:26) >  [facility]  Department of Cardiology  [address] [place] [id number]  [phone]  Cath Lab Report -- Comprehensive Report  Patient: SHANIQUA JUAREZ  Study date: 2022  Account number: [id number]  MR number: [id number]  : 1965  Gender: Female  Race: W  Case Physician(s):  Filiberto Doty MD  Referring Physician:  Michael Ladinsky, M.D.  INDICATIONS: Abnormal stress test.  HISTORY: There was no prior cardiac history. The patient has a family  history of coronary artery disease.  PROCEDURE:  --  Left heart catheterization.  --  Left coronary angiography.  --  Right coronary angiography.  TECHNIQUE: The risks and alternatives of the procedures and conscious  sedation were explained to the patient and informed consent was obtained.  Cardiac catheterization performed electively.  Local anesthetic given. Right radial artery access. Left heart  catheterization. Left coronary artery angiography. The vessel was injected  utilizing a catheter. Right coronary artery angiography. The vessel was  injected utilizing a catheter. RADIATION EXPOSURE: 2.2 min.  CONTRAST GIVEN: Omnipaque 26 ml.  MEDICATIONS GIVEN: Midazolam, 1 mg, IV. Fentanyl, 50 mcg, IV. Verapamil  (Isoptin, Calan, Covera), 5 mg, IA. Heparin, 4000 units, IA. 1% Lidocaine,  10 ml, subcutaneously. 0.9NS, 50 ml, IV.  VENTRICLES: EF was not assessed.  CORONARY VESSELS: The coronary circulation is right dominant.  LM:   --  LM: Normal.  LAD:   --  LAD: Normal.  CX:   --  Circumflex: Normal.  RI:   --  Ramus intermedius: Normal.  RCA:   --  RCA: Normal.  COMPLICATIONS: No complications occurred during the cath lab visit.  DIAGNOSTIC RECOMMENDATIONS: Patient may proceed with her hysterectomy. The  patient should continue with the present medications.  INTERVENTIONAL RECOMMENDATIONS: Patient may proceed with her hysterectomy.  Prepared and signed by  Filiberto Doty MD        -post cardiac cath orders  -radial precautions  -remove radial band at 1900, OOB 2000 and discharge home at 2030 if no bleeding complications   - and A1c 5.9%, discussed with patient regarding lifestyle modifications, after speaking with Dr. Doty, no meds necessary at this time   -follow up outpt in 2 weeks with Cardiologist: Dr. Doty   -Lifestyle modifications discussed to reduce cardiovascular risk factors including weight reduction, and routine follow up with Cardiologist to track your BMI, cholesterol, and glucose levels.

## 2022-02-01 NOTE — DISCHARGE NOTE NURSING/CASE MANAGEMENT/SOCIAL WORK - PATIENT PORTAL LINK FT
You can access the FollowMyHealth Patient Portal offered by [facility] by registering at the following website: [email or web address]. By joining [employer]’s FollowMyHealth portal, you will also be able to view your health information using other applications (apps) compatible with our system.

## 2022-02-01 NOTE — DISCHARGE NOTE PROVIDER - NSDCFUSCHEDAPPT_GEN_ALL_CORE_FT
SHANIQUA JUAREZ ; 02/03/2022 ; NPP Rad Nucmed 100 Op[address] SHANIQUA Crowell ; 02/03/2022 ; NPP Rad Nucmed 100 Opd SHANIQUA Crowell ; 02/08/2022 ; [facility] Preadmit

## 2022-02-01 NOTE — DISCHARGE NOTE PROVIDER - CARE PROVIDER_API CALL
Filiberto Doty)  Cardiovascular Disease; Interventional Cardiology  [address] [place] [address]  Phone: [phone]  Fax: [phone]  Follow Up Time: 2 weeks

## 2022-02-01 NOTE — DISCHARGE NOTE PROVIDER - HOSPITAL COURSE
Brief Hospital Course:   Impression: 56 year old female with no significant medical history but strong family hx of heart disease who was found to have endometrial adenocarcinoma and needs pre-operative clearance.  Her EKG was abnormal with LBBB and stress test in  showed distal anterior wall infarct. She deferred treatment until this time,  [facility] to rule out coronary ischemia prior to her surgery.   now s/p left heart catheterization via RRA approach with Dr. Doty, tolerated procedure well without complications.[facility]      Department of Cardiology  [address] [place] [address]  [phone]  Cath Lab Report -- Comprehensive Report  Patient: SHANIQUA JUAREZ  Study date: 2022  Account number: [id number]  MR number: [id number]  : 1965  Gender: Female  Race: W  Case Physician(s):  Filiberto Doty MD  Referring Physician:  Michael Ladinsky, M.D.  INDICATIONS: Abnormal stress test.  HISTORY: There was no prior cardiac history. The patient has a family  history of coronary artery disease.  PROCEDURE:  --  Left heart catheterization.  --  Left coronary angiography.  --  Right coronary angiography.  TECHNIQUE: The risks and alternatives of the procedures and conscious  sedation were explained to the patient and informed consent was obtained.  Cardiac catheterization performed electively.  Local anesthetic given. Right radial artery access. Left heart  catheterization. Left coronary artery angiography. The vessel was injected  utilizing a catheter. Right coronary artery angiography. The vessel was  injected utilizing a catheter. RADIATION EXPOSURE: 2.2 min.  CONTRAST GIVEN: Omnipaque 26 ml.  MEDICATIONS GIVEN: Midazolam, 1 mg, IV. Fentanyl, 50 mcg, IV. Verapamil  (Isoptin, Calan, Covera), 5 mg, IA. Heparin, 4000 units, IA. 1% Lidocaine,  10 ml, subcutaneously. 0.9NS, 50 ml, IV.  VENTRICLES: EF was not assessed.  CORONARY VESSELS: The coronary circulation is right dominant.  LM:   --  LM: Normal.  LAD:   --  LAD: Normal.  CX:   --  Circumflex: Normal.  RI:   --  Ramus intermedius: Normal.  RCA:   --  RCA: Normal.  COMPLICATIONS: No complications occurred during the cath lab visit.  DIAGNOSTIC RECOMMENDATIONS: Patient may proceed with her hysterectomy. The  patient should continue with the present medications.  INTERVENTIONAL RECOMMENDATIONS: Patient may proceed with her hysterectomy.  Prepared and signed by  Filiberto Doty MD        -post cardiac cath orders  -radial precautions  -remove radial band at 1900, OOB 2000 and discharge home at 2030 if no bleeding complications   - and A1c 5.9%, discussed with patient regarding lifestyle modifications, after speaking with Dr. Doty, no meds necessary at this time   -follow up outpt in 2 weeks with Cardiologist: Dr. Doty   -Lifestyle modifications discussed to reduce cardiovascular risk factors including weight reduction, and routine follow up with Cardiologist to track your BMI, cholesterol, and glucose levels.        At the time of discharge patient was hemodynamically stable and amenable to all terms of discharge. The patient has received verbal instructions from myself regarding discharge plans.     Length of Discharge: 45MIN    Patient is medically stable and cleared for discharge to home with outpatient follow up.

## 2022-02-01 NOTE — DISCHARGE NOTE NURSING/CASE MANAGEMENT/SOCIAL WORK - NSDCPEFALRISK_GEN_ALL_CORE
For information on Fall & Injury Prevention, visit: [email or web address] OR  [email or web address] OR  https://www.cdc.gov/steadi/patient.html

## 2022-02-01 NOTE — DISCHARGE NOTE PROVIDER - NSDCCPCAREPLAN_GEN_ALL_CORE_FT
PRINCIPAL DISCHARGE DIAGNOSIS  Diagnosis: Screening for ischemic heart disease  Assessment and Plan of Treatment: Pt is now s/p [facility] with NO obtructive CAD, normal coronaries.    -see attached with formal cath report, pre op clearance granted for upcoming AIDA      SECONDARY DISCHARGE DIAGNOSES  Diagnosis: Endometrial cancer  Assessment and Plan of Treatment:

## 2022-02-03 ENCOUNTER — APPOINTMENT (OUTPATIENT)
Dept: NUCLEAR MEDICINE | Facility: CLINIC | Age: 57
End: 2022-02-03
Payer: COMMERCIAL

## 2022-02-03 ENCOUNTER — OUTPATIENT (OUTPATIENT)
Dept: OUTPATIENT SERVICES | Facility: HOSPITAL | Age: 57
LOS: 1 days | End: 2022-02-03
Payer: COMMERCIAL

## 2022-02-03 DIAGNOSIS — C54.1 MALIGNANT NEOPLASM OF ENDOMETRIUM: ICD-10-CM

## 2022-02-03 DIAGNOSIS — Z98.890 OTHER SPECIFIED POSTPROCEDURAL STATES: Chronic | ICD-10-CM

## 2022-02-03 PROBLEM — I44.7 LEFT BUNDLE-BRANCH BLOCK, UNSPECIFIED: Chronic | Status: ACTIVE | Noted: 2022-01-20

## 2022-02-03 PROBLEM — R94.39 ABNORMAL RESULT OF OTHER CARDIOVASCULAR FUNCTION STUDY: Chronic | Status: ACTIVE | Noted: 2022-01-20

## 2022-02-03 PROCEDURE — 78815 PET IMAGE W/CT SKULL-THIGH: CPT | Mod: 26,PI

## 2022-02-03 PROCEDURE — 78815 PET IMAGE W/CT SKULL-THIGH: CPT

## 2022-02-03 PROCEDURE — A9552: CPT

## 2022-02-08 ENCOUNTER — RESULT REVIEW (OUTPATIENT)
Age: 57
End: 2022-02-08

## 2022-02-08 ENCOUNTER — INPATIENT (INPATIENT)
Facility: HOSPITAL | Age: 57
LOS: 0 days | Discharge: HOME | DRG: 741 | End: 2022-02-09
Attending: OBSTETRICS & GYNECOLOGY | Admitting: OBSTETRICS & GYNECOLOGY
Payer: COMMERCIAL

## 2022-02-08 VITALS
OXYGEN SATURATION: 99 % | RESPIRATION RATE: 20 BRPM | HEART RATE: 83 BPM | TEMPERATURE: 98 F | DIASTOLIC BLOOD PRESSURE: 75 MMHG | WEIGHT: 172.4 LBS | SYSTOLIC BLOOD PRESSURE: 138 MMHG | HEIGHT: 64 IN

## 2022-02-08 DIAGNOSIS — C54.1 MALIGNANT NEOPLASM OF ENDOMETRIUM: ICD-10-CM

## 2022-02-08 DIAGNOSIS — Z98.890 OTHER SPECIFIED POSTPROCEDURAL STATES: Chronic | ICD-10-CM

## 2022-02-08 LAB
BLD GP AB SCN SERPL QL: SIGNIFICANT CHANGE UP
GLUCOSE BLDC GLUCOMTR-MCNC: 120 MG/DL — HIGH (ref 70–99)
GLUCOSE BLDC GLUCOMTR-MCNC: 99 MG/DL — SIGNIFICANT CHANGE UP (ref 70–99)

## 2022-02-08 PROCEDURE — 88309 TISSUE EXAM BY PATHOLOGIST: CPT | Mod: 26

## 2022-02-08 PROCEDURE — 88342 IMHCHEM/IMCYTCHM 1ST ANTB: CPT | Mod: 26

## 2022-02-08 PROCEDURE — 88341 IMHCHEM/IMCYTCHM EA ADD ANTB: CPT | Mod: 26

## 2022-02-08 PROCEDURE — 88307 TISSUE EXAM BY PATHOLOGIST: CPT | Mod: 26

## 2022-02-08 PROCEDURE — 88112 CYTOPATH CELL ENHANCE TECH: CPT | Mod: 26

## 2022-02-08 RX ORDER — METRONIDAZOLE 500 MG
500 TABLET ORAL ONCE
Refills: 0 | Status: COMPLETED | OUTPATIENT
Start: 2022-02-08 | End: 2022-02-08

## 2022-02-08 RX ORDER — ONDANSETRON 8 MG/1
4 TABLET, FILM COATED ORAL EVERY 6 HOURS
Refills: 0 | Status: DISCONTINUED | OUTPATIENT
Start: 2022-02-08 | End: 2022-02-09

## 2022-02-08 RX ORDER — ONDANSETRON 8 MG/1
4 TABLET, FILM COATED ORAL ONCE
Refills: 0 | Status: DISCONTINUED | OUTPATIENT
Start: 2022-02-08 | End: 2022-02-08

## 2022-02-08 RX ORDER — OXYCODONE HYDROCHLORIDE 5 MG/1
10 TABLET ORAL EVERY 4 HOURS
Refills: 0 | Status: DISCONTINUED | OUTPATIENT
Start: 2022-02-08 | End: 2022-02-09

## 2022-02-08 RX ORDER — ONDANSETRON 8 MG/1
8 TABLET, FILM COATED ORAL EVERY 8 HOURS
Refills: 0 | Status: DISCONTINUED | OUTPATIENT
Start: 2022-02-08 | End: 2022-02-08

## 2022-02-08 RX ORDER — CEFAZOLIN SODIUM 1 G
2000 VIAL (EA) INJECTION ONCE
Refills: 0 | Status: COMPLETED | OUTPATIENT
Start: 2022-02-08 | End: 2022-02-08

## 2022-02-08 RX ORDER — SODIUM CHLORIDE 9 MG/ML
3 INJECTION INTRAMUSCULAR; INTRAVENOUS; SUBCUTANEOUS EVERY 8 HOURS
Refills: 0 | Status: DISCONTINUED | OUTPATIENT
Start: 2022-02-08 | End: 2022-02-09

## 2022-02-08 RX ORDER — DEXTROSE MONOHYDRATE, SODIUM CHLORIDE, AND POTASSIUM CHLORIDE 50; .745; 4.5 G/1000ML; G/1000ML; G/1000ML
1000 INJECTION, SOLUTION INTRAVENOUS
Refills: 0 | Status: DISCONTINUED | OUTPATIENT
Start: 2022-02-08 | End: 2022-02-09

## 2022-02-08 RX ORDER — ENOXAPARIN SODIUM 100 MG/ML
40 INJECTION SUBCUTANEOUS
Qty: 1120 | Refills: 0
Start: 2022-02-08 | End: 2022-03-07

## 2022-02-08 RX ORDER — ENOXAPARIN SODIUM 100 MG/ML
40 INJECTION SUBCUTANEOUS ONCE
Refills: 0 | Status: DISCONTINUED | OUTPATIENT
Start: 2022-02-08 | End: 2022-02-08

## 2022-02-08 RX ORDER — ACETAMINOPHEN 500 MG
975 TABLET ORAL EVERY 6 HOURS
Refills: 0 | Status: DISCONTINUED | OUTPATIENT
Start: 2022-02-08 | End: 2022-02-09

## 2022-02-08 RX ORDER — OXYCODONE HYDROCHLORIDE 5 MG/1
5 TABLET ORAL EVERY 4 HOURS
Refills: 0 | Status: DISCONTINUED | OUTPATIENT
Start: 2022-02-08 | End: 2022-02-09

## 2022-02-08 RX ORDER — HYDROMORPHONE HYDROCHLORIDE 2 MG/ML
0.5 INJECTION INTRAMUSCULAR; INTRAVENOUS; SUBCUTANEOUS
Refills: 0 | Status: DISCONTINUED | OUTPATIENT
Start: 2022-02-08 | End: 2022-02-08

## 2022-02-08 RX ORDER — ENOXAPARIN SODIUM 100 MG/ML
40 INJECTION SUBCUTANEOUS DAILY
Refills: 0 | Status: DISCONTINUED | OUTPATIENT
Start: 2022-02-08 | End: 2022-02-09

## 2022-02-08 RX ORDER — KETOROLAC TROMETHAMINE 30 MG/ML
30 SYRINGE (ML) INJECTION EVERY 6 HOURS
Refills: 0 | Status: DISCONTINUED | OUTPATIENT
Start: 2022-02-08 | End: 2022-02-09

## 2022-02-08 RX ORDER — SODIUM CHLORIDE 9 MG/ML
1000 INJECTION, SOLUTION INTRAVENOUS
Refills: 0 | Status: DISCONTINUED | OUTPATIENT
Start: 2022-02-08 | End: 2022-02-08

## 2022-02-08 RX ADMIN — Medication 100 MILLIGRAM(S): at 18:36

## 2022-02-08 RX ADMIN — ENOXAPARIN SODIUM 40 MILLIGRAM(S): 100 INJECTION SUBCUTANEOUS at 17:25

## 2022-02-08 RX ADMIN — Medication 975 MILLIGRAM(S): at 23:32

## 2022-02-08 RX ADMIN — SODIUM CHLORIDE 3 MILLILITER(S): 9 INJECTION INTRAMUSCULAR; INTRAVENOUS; SUBCUTANEOUS at 13:49

## 2022-02-08 RX ADMIN — Medication 100 MILLIGRAM(S): at 17:26

## 2022-02-08 RX ADMIN — Medication 975 MILLIGRAM(S): at 09:20

## 2022-02-08 RX ADMIN — Medication 200 MILLIGRAM(S): at 10:45

## 2022-02-08 RX ADMIN — SODIUM CHLORIDE 3 MILLILITER(S): 9 INJECTION INTRAMUSCULAR; INTRAVENOUS; SUBCUTANEOUS at 21:52

## 2022-02-08 RX ADMIN — Medication 100 MILLIGRAM(S): at 10:45

## 2022-02-08 RX ADMIN — Medication 975 MILLIGRAM(S): at 17:26

## 2022-02-08 RX ADMIN — SODIUM CHLORIDE 3 MILLILITER(S): 9 INJECTION INTRAMUSCULAR; INTRAVENOUS; SUBCUTANEOUS at 09:45

## 2022-02-08 RX ADMIN — Medication 975 MILLIGRAM(S): at 18:00

## 2022-02-08 NOTE — BRIEF OPERATIVE NOTE - NSICDXBRIEFPROCEDURE_GEN_ALL_CORE_FT
PROCEDURES:  Hysterectomy, total, robot-assisted, laparoscopic, using da Nicole Xi, with bilateral salpingo-oophorectomy and cystoscopy 08-Feb-2022 11:40:48  Canelo Mills  [place] lymph node mapping 08-Feb-2022 11:40:57  Canelo Mills

## 2022-02-08 NOTE — BRIEF OPERATIVE NOTE - SPECIMENS
uterus, cervix, bilateral fallopian tubes and ovaries, para-aortic sentinel lymph node, right and left pelvic sentinel lymph nodes

## 2022-02-08 NOTE — PROGRESS NOTE ADULT - SUBJECTIVE AND OBJECTIVE BOX
GYNECOLOGIC ONCOLOGY PROGRESS NOTE    POD#0    PROBLEMS:  Endometrial cancer    Pt seen and examined at bedside.     SUBJECTIVE:    Patient is without complaints.  Pain well-controlled.  Flatus: Not as of yet  Denies Nausea, Vomiting or Diarrhea.  Denies shortness of breath, chest pain or dyspnea on exertion.  Patient has not eaten yet    OBJECTIVE:     VITALS:  T(F): 97.5 (02-08-22 @ 14:02), Max: 97.8 (02-08-22 @ 09:00)  HR: 84 (02-08-22 @ 14:02) (60 - 84)  BP: 137/86 (02-08-22 @ 14:02) (115/63 - 138/75)  RR: 16 (02-08-22 @ 14:02) (13 - 20)  SpO2: 98% (02-08-22 @ 14:02) (98% - 100%)  Wt(kg): --    I&O's Summary    08 Feb 2022 07:01  -  08 Feb 2022 15:44  --------------------------------------------------------  IN: 390 mL / OUT: 0 mL / NET: 390 mL        MEDICATIONS  (STANDING):  acetaminophen     Tablet .. 975 milliGRAM(s) Oral every 6 hours  ceFAZolin   IVPB 2000 milliGRAM(s) IV Intermittent once  dextrose 5% + sodium chloride 0.45% with potassium chloride 20 mEq/L 1000 milliLiter(s) (125 mL/Hr) IV Continuous <Continuous>  enoxaparin Injectable 40 milliGRAM(s) SubCutaneous daily  metroNIDAZOLE  IVPB 500 milliGRAM(s) IV Intermittent once  sodium chloride 0.9% lock flush 3 milliLiter(s) IV Push every 8 hours    MEDICATIONS  (PRN):  ketorolac   Injectable 30 milliGRAM(s) IV Push every 6 hours PRN Moderate Pain (4 - 6)  ondansetron Injectable 4 milliGRAM(s) IV Push every 6 hours PRN Nausea and/or Vomiting  oxyCODONE    IR 5 milliGRAM(s) Oral every 4 hours PRN Moderate Pain (4 - 6)  oxyCODONE    IR 10 milliGRAM(s) Oral every 4 hours PRN Severe Pain (7 - 10)      Physical Exam:  Constitutional: NAD  Pulmonary: clear to auscultation bilaterally   Cardiovascular: Regular rate and rhythm   Abdomen: soft, non-tender, non-distended, normal bowel sounds  Extremities: no lower extremity edema or calve tenderness, Sidney's sign negative.  Incision: Clean, dry, intact.  Without signs of infection or hernia.      A/P:   Cancer type: Endometrial cancer  Neuro: Pain well controlled. Continue current pain regimen.  CV: No history of cardiovascular disease. Blood pressure well controlled.  Pulm: No active disease. Saturating well on room air. Incentive spirometer use encouraged.  GI: No active disease. Bowel sounds normal. Advance to regular diet and monitor patient's tolerance.  : Love removed. Monitor for spontaneous voiding.  Heme: Hgb 12.9 -> AM labs pending  ID: Afebrile. No antibiotics indicated at this time.   Endo: No active disease.   FEN: IVF at 125 mL/hr. Will discontinue IVF when tolerating PO. AM labs pending.   Skin: No active disease.   Psych: No active disease.   DVT ppx: Ambulation encouraged, SCDs when in bed, Lovenox ordered.  Dispo: Pending labs and AM rounds.

## 2022-02-08 NOTE — BRIEF OPERATIVE NOTE - OPERATION/FINDINGS
Grossly normal uterus, bilateral fallopian tubes and ovaries. Small 6wk sized anteverted uterus. [place]in lymph nodes identified in the para-aortic, right and left pelvic sidewalls.

## 2022-02-08 NOTE — PATIENT PROFILE ADULT - FALL HARM RISK - UNIVERSAL INTERVENTIONS
Bed in lowest position, wheels locked, appropriate side rails in place/Call bell, personal items and telephone in reach/Instruct patient to call for assistance before getting out of bed or chair/Non-slip footwear when patient is out of bed/[place] to call system/Physically safe environment - no spills, clutter or unnecessary equipment/Purposeful Proactive Rounding/Room/bathroom lighting operational, light cord in reach

## 2022-02-09 ENCOUNTER — TRANSCRIPTION ENCOUNTER (OUTPATIENT)
Age: 57
End: 2022-02-09

## 2022-02-09 VITALS
TEMPERATURE: 98 F | HEART RATE: 75 BPM | OXYGEN SATURATION: 98 % | RESPIRATION RATE: 18 BRPM | SYSTOLIC BLOOD PRESSURE: 165 MMHG | DIASTOLIC BLOOD PRESSURE: 98 MMHG

## 2022-02-09 DIAGNOSIS — C54.1 MALIGNANT NEOPLASM OF ENDOMETRIUM: ICD-10-CM

## 2022-02-09 LAB
ANION GAP SERPL CALC-SCNC: 9 MMOL/L — SIGNIFICANT CHANGE UP (ref 5–17)
BASOPHILS # BLD AUTO: 0.01 K/UL — SIGNIFICANT CHANGE UP (ref 0–0.2)
BASOPHILS NFR BLD AUTO: 0.1 % — SIGNIFICANT CHANGE UP (ref 0–2)
BUN SERPL-MCNC: 7.8 MG/DL — LOW (ref 8–20)
CALCIUM SERPL-MCNC: 8.6 MG/DL — SIGNIFICANT CHANGE UP (ref 8.6–10.2)
CHLORIDE SERPL-SCNC: 105 MMOL/L — SIGNIFICANT CHANGE UP (ref 98–107)
CO2 SERPL-SCNC: 23 MMOL/L — SIGNIFICANT CHANGE UP (ref 22–29)
CREAT SERPL-MCNC: 0.7 MG/DL — SIGNIFICANT CHANGE UP (ref 0.5–1.3)
EOSINOPHIL # BLD AUTO: 0.02 K/UL — SIGNIFICANT CHANGE UP (ref 0–0.5)
EOSINOPHIL NFR BLD AUTO: 0.2 % — SIGNIFICANT CHANGE UP (ref 0–6)
GLUCOSE SERPL-MCNC: 139 MG/DL — HIGH (ref 70–99)
HCT VFR BLD CALC: 38.2 % — SIGNIFICANT CHANGE UP (ref 34.5–45)
HGB BLD-MCNC: 12.2 G/DL — SIGNIFICANT CHANGE UP (ref 11.5–15.5)
IMM GRANULOCYTES NFR BLD AUTO: 0.4 % — SIGNIFICANT CHANGE UP (ref 0–1.5)
LYMPHOCYTES # BLD AUTO: 1.22 K/UL — SIGNIFICANT CHANGE UP (ref 1–3.3)
LYMPHOCYTES # BLD AUTO: 11 % — LOW (ref 13–44)
MCHC RBC-ENTMCNC: 26.1 PG — LOW (ref 27–34)
MCHC RBC-ENTMCNC: 31.9 GM/DL — LOW (ref 32–36)
MCV RBC AUTO: 81.8 FL — SIGNIFICANT CHANGE UP (ref 80–100)
MONOCYTES # BLD AUTO: 0.73 K/UL — SIGNIFICANT CHANGE UP (ref 0–0.9)
MONOCYTES NFR BLD AUTO: 6.6 % — SIGNIFICANT CHANGE UP (ref 2–14)
NEUTROPHILS # BLD AUTO: 9.1 K/UL — HIGH (ref 1.8–7.4)
NEUTROPHILS NFR BLD AUTO: 81.7 % — HIGH (ref 43–77)
PLATELET # BLD AUTO: 240 K/UL — SIGNIFICANT CHANGE UP (ref 150–400)
POTASSIUM SERPL-MCNC: 4.4 MMOL/L — SIGNIFICANT CHANGE UP (ref 3.5–5.3)
POTASSIUM SERPL-SCNC: 4.4 MMOL/L — SIGNIFICANT CHANGE UP (ref 3.5–5.3)
RBC # BLD: 4.67 M/UL — SIGNIFICANT CHANGE UP (ref 3.8–5.2)
RBC # FLD: 14.3 % — SIGNIFICANT CHANGE UP (ref 10.3–14.5)
SODIUM SERPL-SCNC: 137 MMOL/L — SIGNIFICANT CHANGE UP (ref 135–145)
WBC # BLD: 11.12 K/UL — HIGH (ref 3.8–10.5)
WBC # FLD AUTO: 11.12 K/UL — HIGH (ref 3.8–10.5)

## 2022-02-09 PROCEDURE — 82962 GLUCOSE BLOOD TEST: CPT

## 2022-02-09 PROCEDURE — 88342 IMHCHEM/IMCYTCHM 1ST ANTB: CPT

## 2022-02-09 PROCEDURE — 86900 BLOOD TYPING SEROLOGIC ABO: CPT

## 2022-02-09 PROCEDURE — 88307 TISSUE EXAM BY PATHOLOGIST: CPT

## 2022-02-09 PROCEDURE — 88341 IMHCHEM/IMCYTCHM EA ADD ANTB: CPT

## 2022-02-09 PROCEDURE — 36415 COLL VENOUS BLD VENIPUNCTURE: CPT

## 2022-02-09 PROCEDURE — S2900: CPT

## 2022-02-09 PROCEDURE — 86901 BLOOD TYPING SEROLOGIC RH(D): CPT

## 2022-02-09 PROCEDURE — 80048 BASIC METABOLIC PNL TOTAL CA: CPT

## 2022-02-09 PROCEDURE — 85025 COMPLETE CBC W/AUTO DIFF WBC: CPT

## 2022-02-09 PROCEDURE — 88309 TISSUE EXAM BY PATHOLOGIST: CPT

## 2022-02-09 PROCEDURE — 86850 RBC ANTIBODY SCREEN: CPT

## 2022-02-09 RX ORDER — ACETAMINOPHEN 500 MG
3 TABLET ORAL
Qty: 0 | Refills: 0 | DISCHARGE
Start: 2022-02-09

## 2022-02-09 RX ADMIN — Medication 975 MILLIGRAM(S): at 00:02

## 2022-02-09 RX ADMIN — Medication 975 MILLIGRAM(S): at 04:56

## 2022-02-09 RX ADMIN — Medication 975 MILLIGRAM(S): at 05:25

## 2022-02-09 RX ADMIN — SODIUM CHLORIDE 3 MILLILITER(S): 9 INJECTION INTRAMUSCULAR; INTRAVENOUS; SUBCUTANEOUS at 04:56

## 2022-02-09 RX ADMIN — Medication 975 MILLIGRAM(S): at 12:45

## 2022-02-09 RX ADMIN — DEXTROSE MONOHYDRATE, SODIUM CHLORIDE, AND POTASSIUM CHLORIDE 125 MILLILITER(S): 50; .745; 4.5 INJECTION, SOLUTION INTRAVENOUS at 04:56

## 2022-02-09 NOTE — PROGRESS NOTE ADULT - ASSESSMENT
A/P:  56 y.o. now s/p RA-TLH, BSO, SLNM, and cystoscopy for endometrial cancer.   HD#2, POD#1.    Cancer type: endometrial cancer, awaiting final pathology.  Neuro: Pain well controlled. Continue current pain regimen.  CV: History of a left BBB s/p heart catherization. Blood pressure well controlled at 118/71.  Pulm: No active disease. Saturating 96% on room air. Incentive spirometer use encouraged  GI: No active disease. Bowel sounds normal, tolerating PO diet. Continue current bowel regimen.   : Voiding spontaneously.  Heme: Hgb 12.9 -> 10.3. No signs or symptoms of anemia.   ID: Afebrile. No antibiotics indicated at this time.   Endo: No active disease.   FEN: IVF at 125 cc/hr. Will discontinue as patient is tolerating PO. Electrolytes WNL.    Skin: No active disease.   Psych: No active disease.   DVT ppx: Ambulation encouraged, SCDs when in bed, lovenox ordered.  Dispo: Likely d/c home today, pending attending approval   A/P:  56 y.o. now s/p RA-TLH, BSO, SLNM, and cystoscopy for endometrial cancer.   HD#2, POD#1.    Cancer type: endometrial cancer, awaiting final pathology.  Neuro: Pain well controlled. Continue current pain regimen.  CV: History of a left BBB s/p heart catherization. Blood pressure well controlled at 118/71.  Pulm: No active disease. Saturating 96% on room air. Incentive spirometer use encouraged  GI: No active disease. Bowel sounds normal, tolerating PO diet. Continue current bowel regimen.   : Voiding spontaneously.  Heme: Hgb 12.9 -> 12.2. No signs or symptoms of anemia.   ID: Afebrile. No antibiotics indicated at this time.   Endo: No active disease.   FEN: IVF at 125 cc/hr. Will discontinue today as patient is tolerating PO. Electrolytes WNL.    Skin: No active disease.   Psych: No active disease.   DVT ppx: Ambulation encouraged, SCDs when in bed, lovenox ordered.  Dispo: Likely d/c home today, pending attending approval   A/P:  56 y.o. now s/p RA-TLH, BSO, SLNM, and cystoscopy for endometrial cancer.   HD#2, POD#1.    Cancer type: endometrial cancer, awaiting final pathology.  Neuro: Pain well controlled. Continue current pain regimen.  CV: History of a left BBB s/p heart catherization. Blood pressure well controlled at 118/71.  Pulm: No active disease. Saturating 96% on room air. Incentive spirometer use encouraged  GI: No active disease. Bowel sounds normal, tolerating PO diet.   : Voiding spontaneously.  Heme: Hgb 12.9 -> 12.2. No signs or symptoms of anemia.   ID: Afebrile. No antibiotics indicated at this time.   Endo: No active disease.   FEN: IVF at 125 cc/hr. Will discontinue today as patient is tolerating PO. Electrolytes WNL.    Skin: No active disease.   Psych: No active disease.   DVT ppx: Ambulation encouraged, SCDs when in bed, lovenox ordered.  Dispo: discharge home    d/w Dr. Horne

## 2022-02-09 NOTE — DISCHARGE NOTE NURSING/CASE MANAGEMENT/SOCIAL WORK - PATIENT PORTAL LINK FT
You can access the FollowMyHealth Patient Portal offered by [facility] by registering at the following website: [email or web address]. By joining [employer]’s FollowMyHealth portal, you will also be able to view your health information using other applications (apps) compatible with our system.

## 2022-02-09 NOTE — DISCHARGE NOTE PROVIDER - NSDCFUADDINST_GEN_ALL_CORE_FT
No heavy housework for 6 weeks. No lifting greater than 10 lbs for at east 6 weeks. No bathtub, bathing or swimming pool. You may drive if not taking narcotics. You may use stairs. Take Colace 100mg 2 times a day or Miralax one capful every night for constipation.

## 2022-02-09 NOTE — DISCHARGE NOTE NURSING/CASE MANAGEMENT/SOCIAL WORK - NSDCPEFALRISK_GEN_ALL_CORE
For information on Fall & Injury Prevention, visit: [email or web address] OR  [email or web address] OR  https://www.cdc.gov/steadi/patient.html

## 2022-02-09 NOTE — DISCHARGE NOTE PROVIDER - CARE PROVIDER_API CALL
Canelo Horne)  Gynecologic Oncology; Obstetrics and Gynecology  [address] [place] [address]  Phone: [phone]  Fax: [phone]  Established Patient  Follow Up Time: 2 weeks

## 2022-02-09 NOTE — PROGRESS NOTE ADULT - SUBJECTIVE AND OBJECTIVE BOX
GYNECOLOGIC ONCOLOGY PROGRESS NOTE  Patient is a 56 y.o. now s/p RA-TLH, BSO, SLNM, and cystoscopy for endometrial cancer.   HD#2, POD#1.    PROBLEMS:  Endometrial cancer    Pt seen and examined at bedside.     SUBJECTIVE:    Patient is without complaints.  Pain well-controlled.  Flatus: denies  Denies Nausea, Vomiting or Diarrhea.  Denies shortness of breath, chest pain or dyspnea on exertion.  Tolerating diet.    OBJECTIVE:     VITALS:  T(F): 98.3 (02-09-22 @ 04:43), Max: 98.8 (02-08-22 @ 16:13)  HR: 78 (02-09-22 @ 04:43) (60 - 96)  BP: 118/71 (02-09-22 @ 04:43) (115/63 - 148/85)  RR: 18 (02-09-22 @ 04:43) (13 - 20)  SpO2: 96% (02-09-22 @ 04:43) (94% - 100%)    I&O's Summary    08 Feb 2022 07:01  -  09 Feb 2022 07:00  --------------------------------------------------------  IN: 390 mL / OUT: 100 mL / NET: 290 mL        MEDICATIONS  (STANDING):  acetaminophen     Tablet .. 975 milliGRAM(s) Oral every 6 hours  dextrose 5% + sodium chloride 0.45% with potassium chloride 20 mEq/L 1000 milliLiter(s) (125 mL/Hr) IV Continuous <Continuous>  enoxaparin Injectable 40 milliGRAM(s) SubCutaneous daily  sodium chloride 0.9% lock flush 3 milliLiter(s) IV Push every 8 hours    MEDICATIONS  (PRN):  ketorolac   Injectable 30 milliGRAM(s) IV Push every 6 hours PRN Moderate Pain (4 - 6)  ondansetron Injectable 4 milliGRAM(s) IV Push every 6 hours PRN Nausea and/or Vomiting  oxyCODONE    IR 5 milliGRAM(s) Oral every 4 hours PRN Moderate Pain (4 - 6)  oxyCODONE    IR 10 milliGRAM(s) Oral every 4 hours PRN Severe Pain (7 - 10)      Physical Exam:  Constitutional: NAD  Pulmonary: clear to auscultation bilaterally   Cardiovascular: Regular rate and rhythm   Abdomen: soft, non-tender,  slightly distended, tympanic, normal bowel sounds  Extremities: no lower extremity edema or calve tenderness, Sidney's sign negative.  Incision: Opsites clean, dry, intact.  Without signs of infection or hernia.    LABS:                        12.2   11.12 )-----------( 240      ( 09 Feb 2022 06:28 )             38.2     02-09    137  |  105  |  7.8<L>  ----------------------------<  139<H>  4.4   |  23.0  |  0.70    Ca    8.6      09 Feb 2022 06:28             GYNECOLOGIC ONCOLOGY PROGRESS NOTE  Patient is a 56 y.o. now s/p RA-TLH, BSO, SLNM, and cystoscopy for endometrial cancer.   HD#2, POD#1.    PROBLEMS:  Endometrial cancer    Pt seen and examined at bedside.     SUBJECTIVE:  Patient is without complaints.  Pain well-controlled.  Flatus: denies  Denies Nausea, Vomiting or Diarrhea.  Denies shortness of breath, chest pain or dyspnea on exertion.  Tolerating diet.    OBJECTIVE:   VITALS:  T(F): 98.3 (02-09-22 @ 04:43), Max: 98.8 (02-08-22 @ 16:13)  HR: 78 (02-09-22 @ 04:43) (60 - 96)  BP: 118/71 (02-09-22 @ 04:43) (115/63 - 148/85)  RR: 18 (02-09-22 @ 04:43) (13 - 20)  SpO2: 96% (02-09-22 @ 04:43) (94% - 100%)    I&O's Summary    08 Feb 2022 07:01  -  09 Feb 2022 07:00  --------------------------------------------------------  IN: 390 mL / OUT: 100 mL / NET: 290 mL    MEDICATIONS  (STANDING):  acetaminophen     Tablet .. 975 milliGRAM(s) Oral every 6 hours  dextrose 5% + sodium chloride 0.45% with potassium chloride 20 mEq/L 1000 milliLiter(s) (125 mL/Hr) IV Continuous <Continuous>  enoxaparin Injectable 40 milliGRAM(s) SubCutaneous daily  sodium chloride 0.9% lock flush 3 milliLiter(s) IV Push every 8 hours    MEDICATIONS  (PRN):  ketorolac   Injectable 30 milliGRAM(s) IV Push every 6 hours PRN Moderate Pain (4 - 6)  ondansetron Injectable 4 milliGRAM(s) IV Push every 6 hours PRN Nausea and/or Vomiting  oxyCODONE    IR 5 milliGRAM(s) Oral every 4 hours PRN Moderate Pain (4 - 6)  oxyCODONE    IR 10 milliGRAM(s) Oral every 4 hours PRN Severe Pain (7 - 10)    Physical Exam:  Constitutional: NAD  Pulmonary: clear to auscultation bilaterally   Cardiovascular: Regular rate and rhythm   Abdomen: soft, non-tender,  slightly distended, tympanic, normal bowel sounds  Extremities: no lower extremity edema or calve tenderness, Sidney's sign negative.  Incision: Opsites clean, dry, intact. Without signs of infection or hernia.    LABS:                        12.2   11.12 )-----------( 240      ( 09 Feb 2022 06:28 )             38.2     02-09    137  |  105  |  7.8<L>  ----------------------------<  139<H>  4.4   |  23.0  |  0.70    Ca    8.6      09 Feb 2022 06:28

## 2022-02-09 NOTE — DISCHARGE NOTE PROVIDER - HOSPITAL COURSE
Patient is a 56 y.o. with a history of endometrial cancer who presented for a scheduled RA-TLH, BSO, SLNM, and cystoscopy. Her surgery was uncomplicated as well as her postoperative course. Upon discharge she was tolerating PO, ambulating independently, passing flatus, and voiding spontaneously.

## 2022-02-09 NOTE — DISCHARGE NOTE PROVIDER - NSDCCPTREATMENT_GEN_ALL_CORE_FT
PRINCIPAL PROCEDURE  Procedure: Robot-assisted laparoscopic total hysterectomy with bilateral salpingectomy and cystoscopy using da Nicole Xi  Findings and Treatment:       SECONDARY PROCEDURE  Procedure: [place] lymph node mapping  Findings and Treatment:

## 2022-02-10 LAB — NON-GYNECOLOGICAL CYTOLOGY STUDY: SIGNIFICANT CHANGE UP

## 2022-02-15 ENCOUNTER — NON-APPOINTMENT (OUTPATIENT)
Age: 57
End: 2022-02-15

## 2022-02-16 ENCOUNTER — OUTPATIENT (OUTPATIENT)
Dept: OUTPATIENT SERVICES | Facility: HOSPITAL | Age: 57
LOS: 1 days | End: 2022-02-16

## 2022-02-16 ENCOUNTER — APPOINTMENT (OUTPATIENT)
Dept: ULTRASOUND IMAGING | Facility: CLINIC | Age: 57
End: 2022-02-16
Payer: COMMERCIAL

## 2022-02-16 DIAGNOSIS — Z98.890 OTHER SPECIFIED POSTPROCEDURAL STATES: Chronic | ICD-10-CM

## 2022-02-16 DIAGNOSIS — E07.89 OTHER SPECIFIED DISORDERS OF THYROID: ICD-10-CM

## 2022-02-16 PROCEDURE — 76536 US EXAM OF HEAD AND NECK: CPT | Mod: 26

## 2022-02-18 LAB — SURGICAL PATHOLOGY STUDY: SIGNIFICANT CHANGE UP

## 2022-02-23 ENCOUNTER — APPOINTMENT (OUTPATIENT)
Dept: GYNECOLOGIC ONCOLOGY | Facility: CLINIC | Age: 57
End: 2022-02-23
Payer: COMMERCIAL

## 2022-02-23 VITALS
SYSTOLIC BLOOD PRESSURE: 128 MMHG | OXYGEN SATURATION: 96 % | DIASTOLIC BLOOD PRESSURE: 84 MMHG | HEART RATE: 102 BPM | RESPIRATION RATE: 16 BRPM

## 2022-02-23 PROCEDURE — 99024 POSTOP FOLLOW-UP VISIT: CPT

## 2022-02-28 NOTE — DISCUSSION/SUMMARY
[Clean] : was clean [Dry] : was dry [Intact] : was intact [None] : had no drainage [Normal Skin] : normal appearance [Doing Well] : is doing well [Excellent Pain Control] : has excellent pain control [No Sign of Infection] : is showing no signs of infection [Findings] : These findings were discussed with [unfilled] in detail. She understood and accepted the rationale for this recommendation and also understood the serious impact that these findings could have upon her prognosis for survival. [Erythema] : was not erythematous [Ecchymosis] : was not ecchymotic [Seroma] : had no seroma [Firm] : soft [Tender] : nontender [Abnormal Bowel Sounds] : normal bowel sounds [Rebound] : no rebound tenderness [Guarding] : no guarding [Incisional Hernia] : no incisional hernia [Mass] : no palpable mass [External Genitalia Abnormal] : normal external genitalia [Vaginal Exam Abnormal] : normal vaginal exam [de-identified] : Mae Qureshi Medical assistant chaperoned during gynecologic exam.  [FreeTextEntry1] : Pertinent findings revealed small anteverted uterus with normal tubes and ovaries and cervix, Bilateral hypogastric and precaval sentinel nodes identified and excised. Normal upper abdomen. Normal cystoscopy. No gross evidence of a metastatic process. \par \par Final pathology reviewed.

## 2022-02-28 NOTE — ASSESSMENT
[FreeTextEntry1] : I discussed with patient and her  that her final pathology revealed a Stage 1A grade is vague ?. Patients report was sent to [facility] and we are awaiting results. depending on [facility] opinion she may require vaginal radiation. As best as we can tell she does not require chemotherapy. We will discuss patients case at our TMB meeting to discuss the best treatment plan for patient and she will follow up in 2 weeks to discuss final treatment plan. \par \par I also reviewed thyroid results with patient and recommended a consultation with Dr. Porfirio Leiva for further evaluation. Patient stated she understood and agreed to comply. \par \par Patient will complete course of Lovenox.

## 2022-02-28 NOTE — REASON FOR VISIT
[Post Op] : post op visit [Spouse] : spouse [de-identified] : 2/8/22 [de-identified] : [place] Robot Assisted Total Laparoscopic Hysterectomy, Bilateral Salping-oophorectomy, Bilateral Pelvic and Para-aortic sentinel Lymph node Dissection, Cystoscopy, PW at [facility] for endometrial cancer.  [de-identified] : Patient has recovered well from her surgery, Denies any SOB, abnormal pain or VB. Bowel and bladder function are wnl. Patient states she feels well from a gynecological stand point. \par \par Recent US thyroid + Parathyroid 2/16/22 IMPRESSION:\par Subcentimeter bilateral thyroid nodules (TI-RAD 4).\par \par TI-RAD 4: Moderately suspicious (FNA if > 1.5 cm, Follow if > 1 cm)\par \par ACR Thyroid Imaging, Reporting and Data System (TI-RADS): White Paper of the ACR TI-RADS Committee. J Am Luca Radiol 2017;14:587-595.\par \par TI-RAD 1: Benign (No FNA)\par TI-RAD 2: Not suspicious (No FNA)\par TI-RAD 3: Mildly suspicious (FNA if > 2.5 cm, Follow if >1.5 cm)\par TI-RAD 4: Moderately suspicious (FNA if > 1.5 cm, Follow if > 1 cm)\par TI-RAD 5: Highly suspicious (FNA if > 1 cm, Follow if > 0.5 cm)

## 2022-02-28 NOTE — END OF VISIT
[FreeTextEntry3] : Written by Mae Qureshi, acting as a scribe for Dr. Canelo Horne \par This note accurately reflects the work and decisions made by me.

## 2022-03-01 ENCOUNTER — NON-APPOINTMENT (OUTPATIENT)
Age: 57
End: 2022-03-01

## 2022-03-09 ENCOUNTER — APPOINTMENT (OUTPATIENT)
Dept: GYNECOLOGIC ONCOLOGY | Facility: CLINIC | Age: 57
End: 2022-03-09
Payer: COMMERCIAL

## 2022-03-09 PROCEDURE — 99213 OFFICE O/P EST LOW 20 MIN: CPT | Mod: 24

## 2022-03-14 ENCOUNTER — OUTPATIENT (OUTPATIENT)
Dept: OUTPATIENT SERVICES | Facility: HOSPITAL | Age: 57
LOS: 1 days | Discharge: HOME | End: 2022-03-14

## 2022-03-14 DIAGNOSIS — C54.1 MALIGNANT NEOPLASM OF ENDOMETRIUM: ICD-10-CM

## 2022-03-14 DIAGNOSIS — Z98.890 OTHER SPECIFIED POSTPROCEDURAL STATES: Chronic | ICD-10-CM

## 2022-03-14 NOTE — PHYSICAL EXAM
[Normal] : Mood and affect: Normal [FreeTextEntry1] : Mae Qureshi Medical assistant was present during discussion.

## 2022-03-14 NOTE — ASSESSMENT
[FreeTextEntry1] : I advised the patient and her  that we discussed her case at our tumor board meeting and our recommendation was for patient to have a consultation with hem/onc and rad/onc for chemotherapy and radiation therapy. I discussed with patient that having both increased her changes of being cured but understands that it is still not 100 percent. Patient will follow up in my office in 3 months.

## 2022-03-14 NOTE — REASON FOR VISIT
[Spouse] : spouse [FreeTextEntry1] : [place] Location \par \par [facility] [place]. [phone]\par [address] [place] [address] \par \par -Newly diagnosed endometrial cancer. \par -S/p RA TLH BSO. b/l PPASLND, cysto, PW on 2/8/22. \par -Seen post operatively 2/23/22. \par -Recommended consult w/ Dr. Leiva for thyroid findings appt 3/17/22\par -Discussed Stage 1A grade vague. \par -Returns for TMB recommendation and tx plan. \par -TMB recommendation best tx for pt: Adjuvant therapy w/ systemic chemo and vaginal brachytherapy

## 2022-03-14 NOTE — HISTORY OF PRESENT ILLNESS
[FreeTextEntry1] : This 57 y/o with newly diagnosed endometrial cancer is s/p RA TLH BSO, b/l PPASLND, cysto, PW on 2/8/22. She was seen post operatively on 2/23/22 where i discussed Stage 1A grade was vague. I advised the patient that I wanted to present her case at our tumor board meeting to discuss the best treatment plan for patient. She returns to the office today to discuss our recommendation which was adjuvant therapy with systemic chemo and vaginal brachytherapy. \par \par Patient had a thyroid US which warranted further work up. I recommended patient see. Dr. Porfirio Leiva for further evaluation. She has an upcoming appt on 3/17/22 for a consultation.

## 2022-03-17 ENCOUNTER — TRANSCRIPTION ENCOUNTER (OUTPATIENT)
Age: 57
End: 2022-03-17

## 2022-03-17 ENCOUNTER — APPOINTMENT (OUTPATIENT)
Dept: SURGICAL ONCOLOGY | Facility: CLINIC | Age: 57
End: 2022-03-17
Payer: COMMERCIAL

## 2022-03-17 ENCOUNTER — RESULT REVIEW (OUTPATIENT)
Age: 57
End: 2022-03-17

## 2022-03-17 ENCOUNTER — APPOINTMENT (OUTPATIENT)
Dept: HEMATOLOGY ONCOLOGY | Facility: CLINIC | Age: 57
End: 2022-03-17
Payer: COMMERCIAL

## 2022-03-17 ENCOUNTER — APPOINTMENT (OUTPATIENT)
Dept: RADIATION ONCOLOGY | Facility: CLINIC | Age: 57
End: 2022-03-17
Payer: COMMERCIAL

## 2022-03-17 ENCOUNTER — NON-APPOINTMENT (OUTPATIENT)
Age: 57
End: 2022-03-17

## 2022-03-17 ENCOUNTER — OUTPATIENT (OUTPATIENT)
Dept: OUTPATIENT SERVICES | Facility: HOSPITAL | Age: 57
LOS: 1 days | Discharge: HOME | End: 2022-03-17
Payer: COMMERCIAL

## 2022-03-17 VITALS
RESPIRATION RATE: 16 BRPM | WEIGHT: 177 LBS | OXYGEN SATURATION: 98 % | BODY MASS INDEX: 30.22 KG/M2 | DIASTOLIC BLOOD PRESSURE: 91 MMHG | SYSTOLIC BLOOD PRESSURE: 137 MMHG | HEART RATE: 84 BPM | HEIGHT: 64 IN | TEMPERATURE: 97.8 F

## 2022-03-17 VITALS
DIASTOLIC BLOOD PRESSURE: 80 MMHG | WEIGHT: 176 LBS | OXYGEN SATURATION: 96 % | HEART RATE: 80 BPM | SYSTOLIC BLOOD PRESSURE: 146 MMHG

## 2022-03-17 VITALS
WEIGHT: 177 LBS | DIASTOLIC BLOOD PRESSURE: 81 MMHG | SYSTOLIC BLOOD PRESSURE: 136 MMHG | HEART RATE: 80 BPM | OXYGEN SATURATION: 97 % | BODY MASS INDEX: 30.22 KG/M2 | HEIGHT: 64 IN

## 2022-03-17 DIAGNOSIS — E04.2 NONTOXIC MULTINODULAR GOITER: ICD-10-CM

## 2022-03-17 DIAGNOSIS — Z78.9 OTHER SPECIFIED HEALTH STATUS: ICD-10-CM

## 2022-03-17 DIAGNOSIS — Z98.890 OTHER SPECIFIED POSTPROCEDURAL STATES: Chronic | ICD-10-CM

## 2022-03-17 LAB
BASOPHILS # BLD AUTO: 0.1 K/UL — SIGNIFICANT CHANGE UP (ref 0–0.2)
BASOPHILS NFR BLD AUTO: 0.8 % — SIGNIFICANT CHANGE UP (ref 0–2)
EOSINOPHIL # BLD AUTO: 0.1 K/UL — SIGNIFICANT CHANGE UP (ref 0–0.5)
EOSINOPHIL NFR BLD AUTO: 1.6 % — SIGNIFICANT CHANGE UP (ref 0–6)
HCT VFR BLD CALC: 43.5 % — SIGNIFICANT CHANGE UP (ref 34.5–45)
HGB BLD-MCNC: 14 G/DL — SIGNIFICANT CHANGE UP (ref 11.5–15.5)
LYMPHOCYTES # BLD AUTO: 2.5 K/UL — SIGNIFICANT CHANGE UP (ref 1–3.3)
LYMPHOCYTES # BLD AUTO: 29.6 % — SIGNIFICANT CHANGE UP (ref 13–44)
MCHC RBC-ENTMCNC: 26.8 PG — LOW (ref 27–34)
MCHC RBC-ENTMCNC: 32.1 G/DL — SIGNIFICANT CHANGE UP (ref 32–36)
MCV RBC AUTO: 83.5 FL — SIGNIFICANT CHANGE UP (ref 80–100)
MONOCYTES # BLD AUTO: 0.5 K/UL — SIGNIFICANT CHANGE UP (ref 0–0.9)
MONOCYTES NFR BLD AUTO: 5.7 % — SIGNIFICANT CHANGE UP (ref 2–14)
NEUTROPHILS # BLD AUTO: 5.2 K/UL — SIGNIFICANT CHANGE UP (ref 1.8–7.4)
NEUTROPHILS NFR BLD AUTO: 62.3 % — SIGNIFICANT CHANGE UP (ref 43–77)
PLATELET # BLD AUTO: 248 K/UL — SIGNIFICANT CHANGE UP (ref 150–400)
RBC # BLD: 5.21 M/UL — HIGH (ref 3.8–5.2)
RBC # FLD: 13.3 % — SIGNIFICANT CHANGE UP (ref 10.3–14.5)
WBC # BLD: 8.4 K/UL — SIGNIFICANT CHANGE UP (ref 3.8–10.5)
WBC # FLD AUTO: 8.4 K/UL — SIGNIFICANT CHANGE UP (ref 3.8–10.5)

## 2022-03-17 PROCEDURE — 99214 OFFICE O/P EST MOD 30 MIN: CPT

## 2022-03-17 PROCEDURE — 99204 OFFICE O/P NEW MOD 45 MIN: CPT | Mod: 25

## 2022-03-17 PROCEDURE — 99205 OFFICE O/P NEW HI 60 MIN: CPT

## 2022-03-17 PROCEDURE — 99204 OFFICE O/P NEW MOD 45 MIN: CPT

## 2022-03-18 LAB
ALBUMIN SERPL ELPH-MCNC: 4.6 G/DL
ALP BLD-CCNC: 84 U/L
ALT SERPL-CCNC: 20 U/L
ANION GAP SERPL CALC-SCNC: 12 MMOL/L
AST SERPL-CCNC: 19 U/L
BILIRUB SERPL-MCNC: 0.2 MG/DL
BUN SERPL-MCNC: 13 MG/DL
CALCIUM SERPL-MCNC: 9.7 MG/DL
CANCER AG125 SERPL-ACNC: 28 U/ML
CHLORIDE SERPL-SCNC: 101 MMOL/L
CO2 SERPL-SCNC: 24 MMOL/L
CREAT SERPL-MCNC: 0.71 MG/DL
EGFR: 100 ML/MIN/1.73M2
GLUCOSE SERPL-MCNC: 99 MG/DL
HAV IGM SER QL: NONREACTIVE
HBV CORE IGM SER QL: NONREACTIVE
HBV SURFACE AG SER QL: NONREACTIVE
HCV AB SER QL: NONREACTIVE
HCV S/CO RATIO: 0.17 S/CO
INR PPP: 1.08 RATIO
MAGNESIUM SERPL-MCNC: 2 MG/DL
POTASSIUM SERPL-SCNC: 4.2 MMOL/L
PROT SERPL-MCNC: 8.1 G/DL
PT BLD: 12.5 SEC
SODIUM SERPL-SCNC: 137 MMOL/L
SURGICAL PATHOLOGY STUDY: SIGNIFICANT CHANGE UP

## 2022-03-18 NOTE — RESULTS/DATA
[FreeTextEntry1] : Specimen(s) Submitted\par 1  Paraaortic sentinel lymph node\par 2  Right pelvic sentinel lymph node\par 3  Left pelvic sentinel lymph node\par 4  Uterus, cervix, bilateral tubes, bilateral ovaries\par \par Final Diagnosis\par 1. Para-aortic sentinel lymph node, sentinel lymph node dissection:\par \par - One lymph node, negative for carcinoma (0/1).\par - Immunohistochemical stain for pancytokeratin is performed on block\par 1-A and is negative.\par \par 2. Right pelvic sentinel lymph node, sentinel lymph node dissection:\par - One lymph node, negative for carcinoma (0/1).\par - Immunohistochemical stain for pancytokeratin is performed on block\par 2-A and is negative.\par \par 3. Left pelvic sentinel lymph node, sentinel lymph node dissection:\par - Two lymph nodes, negative for carcinoma (0/2).\par - Immunohistochemical stain for pancytokeratin is performed on block\par 3-A and is negative.\par \par 4. Uterus, cervix, bilateral fallopian tubes and ovaries, hysterectomy and\par bilateral salpingo-oophorectomy:\par - Endometrioid adenocarcinoma, FIGO grade 1 to focally grade 2 and a\par tiny focus of grade 3 ( slide 4-K)\par - The carcinoma invades the myometrium superficially.\par - Adenomyosis.\par - Adenomyoma.\par - Right fallopian tube with no significant histopathology.\par - The carcinoma is present within the lumen of the left fallopian\par tube.\par - Right ovary with inclusion and mesothelial lined cysts.\par - Left ovary with findings suggestive of endometriosis, inclusion and\par mesothelial lined cysts.\par - Cervix with chronic inflammation.\par - Please refer to synoptic report/note below.\par \par Note: Immunohistochemical stains for ER, ME, P16 and P53 were performed on\par block 4-K at [facility] and interpreted at [facility]\par as follows:\par \par p16, p53, ER and ME: Inconclusive\par \par Case was seen in consultation with Dr. Blaine Mejia at [place], [place].\par \par The report is attached and on file in the Department of Pathology.  \par \par Pelvic wash is negative. \par \par \par MLH1:   Intact nuclear expression\par MSH2:   Intact nuclear expression\par MSH6:   Intact nuclear expression\par PMS2:   Intact nuclear expression\par \par 1/19/22 EMB \par Final Diagnosis\par Slides for review (endometrium, curettage):\par -Adenocarcinoma of endometrial origin (see Note)\par \par Note:\par \par The tumor is  Grade 1  (architecture), Grade 3 (nuclear features).\par Differential diagnosis includes a mixed endometrioid /serous carcinoma vs\par an endometrioid adenocarcinoma, FIGO 2\par Cannot exclude  POLE, p53 or MMR abnormalities in this tumor.

## 2022-03-18 NOTE — HISTORY OF PRESENT ILLNESS
[T: ___] : T[unfilled] [N: ___] : N[unfilled] [de-identified] : 57 yo F with no known PMH who was diagnosed with endometrial cancer in December 2021.\par \par She reported irregular bleeding over the past year. She underwent EMC on 12/14/21 with Dr. Lula Horne with pathology revealing endometrial adenocarcinoma FIGO2 vs. high grade.  She next saw Dr. Canelo Horne who repeated the biopsy, which showed high grade carcinoma (endometrioid grade II vs. serous carcinoma) Staging PET on 2/3/22 showed FDG avid uterine focus, compatible with the given diagnosis of endometrial carcinoma. Small mildly FDG avid focus in the vaginal introitus, slightly to the left of the midline. FDG avid right lower thyroid lobe focus. FDG avid focus in the subcarinal region, possibly a small lymph node versus mid esophagus. Focal FDG avidity in the posterior nasopharyngeal and bilateral tonsillar regions, possibly infectious/inflammatory. Several FDG-avid bilateral level IIa and left level II cervical nodes, likely reactive. Follow up thyroid U/S showed subcentimeter bilateral thyroid nodules (TI-RAD 4).  Moderately suspicious. \par \par On 2/8/22 Dr. Horne performed a DaVinci Robot Assisted Total Laparoscopic Hysterectomy, Bilateral Salpingo-oophorectomy, Bilateral Pelvic and Para-aortic [place] Lymph Node Dissection, Cystoscopy and pelvic Washings.  Pathology c/w endometrioid adenocarcinoma, FIGO grade 1 to focally grade 2 and a tiny focus of grade 3. The carcinoma invades the myometrium superficially. Adenomyosis. Adenomyoma. Right fallopian tube with no significant histopathology. The carcinoma is present within the lumen of the left fallopian tube. Negative margins. No loss of nuclear expression of MMR proteins. ER positive, MD patchy positive.  [de-identified] : The patient feels well, and states that she is healing well from surgery. She denies nausea, vomiting, abdominal pain, diarrhea, chest pain, SOB, vaginal bleeding.

## 2022-03-18 NOTE — REVIEW OF SYSTEMS
[Fever] : no fever [Chills] : no chills [Night Sweats] : no night sweats [Chest Pain] : no chest pain [Shortness Of Breath] : no shortness of breath [Abdominal Pain] : no abdominal pain [Vaginal Discharge] : no vaginal discharge [Dysmenorrhea/Abn Vaginal Bleeding] : no dysmenorrhea/abnormal vaginal bleeding [Easy Bleeding] : no tendency for easy bleeding [Easy Bruising] : no tendency for easy bruising [Swollen Glands] : no swollen glands

## 2022-03-18 NOTE — PHYSICAL EXAM
[Fully active, able to carry on all pre-disease performance without restriction] : Status 0 - Fully active, able to carry on all pre-disease performance without restriction [Thin] : thin [Normal] : normoactive bowel sounds, soft and nontender, no hepatosplenomegaly or masses appreciated [de-identified] : surgical scars, well healed

## 2022-03-18 NOTE — ASSESSMENT
[FreeTextEntry1] : The patient is a 56 year old woman with no significant past medical history with newly diagnosed endometrial cancer\par \par # Endometrial Cancer \par - stage IA, endometrioid type, FIGO grade 1-2 with focal areas of grade 3. This was corroborated by second opinion as [facility]. MMR-intact\par - HER2 status pending\par - Of note, endometrial biopsy from 1/19/22 showed features concerning for serous carcinoma, which is a higher grade tumor. There were no serous features identified on the final pathology report or after review from second opinion, however. Case was discussed in tumor board. Given discrepancy in tumor grades, consensus of the tumor board was to pursue treatment with chemotherapy and radiation. The patient sought a second opinion, where the pathology was also reviewed. She ultimately chose not to pursue chemotherapy. \par - follow up in 1 month

## 2022-03-19 NOTE — LETTER CLOSING
[Consult Closing:] : Thank you for allowing me to participate in the care of this patient.  If you have any questions, please do not hesitate to contact me. [Sincerely yours,] : Sincerely yours, [FreeTextEntry3] : Yogesh Menjivar MD\par Physician in Chief\par Department of Radiation Medicine\par [facility] [place]\par [facility] [place]\par \par  of Radiation Medicine\par [facility]\par at  [facility] [place]\par \par Radiation \par [facility]par [facility] [place]\par [address]\par [place], [place] [address]\par \par Tel: [phone]\par Fax: [phone]\par

## 2022-03-19 NOTE — HISTORY OF PRESENT ILLNESS
[FreeTextEntry1] : This 56 year-old female presents for radiation medicine consultation accompanied by her  Stevo.  Ms. Howe was diagnosed with endometrial cancer in December 2021.  She reported irregular bleeding (irregular menses and several 6-week episodes of continual bleeding) over the past year.  She underwent EMC on 12/14/21 with Dr. Lula Horne with pathology revealing endometrial adenocarcinoma FIGO2 vs. high grade.  She then saw Dr. Canelo Horne who recommended surgery.\par \par Staging PET/CT  2/3/22\par FDG avid uterine focus, compatible with the given diagnosis of endometrial carcinoma.  Small mildly FDG avid focus in the vaginal introitus, slightly to the left of the midline.  FDG avid right lower thyroid lobe focus. FDG avid focus in the subcarinal region, possibly a small lymph node versus mid esophagus.  Focal FDG avidity in the posterior nasopharyngeal and bilateral tonsillar regions, possibly infectious/inflammatory.  Several FDG-avid bilateral level IIa and left level II cervical nodes, likely reactive. \par \par 2/8/22 \par DaVinci Robot Assisted Total Laparoscopic Hysterectomy, Bilateral Salpingo-oophorectomy, Bilateral Pelvic and Para-aortic [place] Lymph Node Dissection, Cystoscopy and pelvic Washings. Pathology c/w endometrioid adenocarcinoma, FIGO grade 1 to focally grade 2 and a tiny focus of grade 3. The carcinoma invades the myometrium superficially. Adenomyosis. Adenomyoma. Right fallopian tube with no significant histopathology.  The carcinoma is present within the lumen of the left fallopian tube. Negative margins.  No loss of nuclear expression of MMR proteins. ER positive, MI patchy positive. \par \par \par Thyroid U/S 2/16/2022\par ) Right Lobe: 5.9 cm x 2.4 cm x 1.8 cm. Heterogeneous in echotexture. Upper pole cyst, measuring 0.8 x 0.4 x 0.5 cm. Ill-defined hypoechoic nodule in the upper pole, measuring 0.7 x 0.5 x 0.6 cm (TI-RAD 4).\par 2) Left Lobe: 5.8 cm x 1.8 cm x 2.1 cm. Heterogeneous in echotexture. Hypoechoic nodule in the anterior upper pole, measuring 0.6 x 0.3 x 0.5 cm (TI-RAD 4). Posterior hypoechoic left thyroid nodule, measuring 0.4 x 0.3 x 0.6 cm (TI-RAD 4).\par 3) Isthmus: 4 mm.\par 4) Cervical Lymph Nodes: No enlarged or abnormal morphology cervical nodes.\par IMPRESSION: Subcentimeter bilateral thyroid nodules (TI-RAD 4).\par TI-RAD 4: Moderately suspicious (FNA if > 1.5 cm, Follow if > 1 cm)\par \par \par \par 2/8/22 Dr. Horne performed a DaVinci Robot Assisted Total Laparoscopic Hysterectomy, Bilateral Salpingo-oophorectomy, Bilateral Pelvic and Para-aortic [place] Lymph Node Dissection, Cystoscopy and pelvic Washings. Pathology c/w endometrioid adenocarcinoma, FIGO grade 1 to focally grade 2 and a tiny focus of grade 3. The carcinoma invades the myometrium superficially. Adenomyosis. Adenomyoma. Right fallopian tube with no significant histopathology. The carcinoma is present within the lumen of the left fallopian tube. Negative margins. No loss of nuclear expression of MMR proteins. ER positive, MI patchy positive. \par \par TNM stage: T1a, N0 \par

## 2022-03-19 NOTE — REVIEW OF SYSTEMS
[Negative] : Endocrine [FreeTextEntry8] : status post radical hysterectomy for endometrial cancer [de-identified] : thyroid nodules

## 2022-03-19 NOTE — DISEASE MANAGEMENT
[Pathological] : TNM Stage: p [IA] : IA [FreeTextEntry4] : endometrial adenocarcinoma [TTNM] : 1a [NTNM] : 0 [MTNM] : n/a

## 2022-03-19 NOTE — PHYSICAL EXAM
[Normal] : normoactive bowel sounds, soft and nontender, no hepatosplenomegaly or masses appreciated [FreeTextEntry1] : deferred [de-identified] : deferred

## 2022-03-19 NOTE — DISEASE MANAGEMENT
[Pathological] : TNM Stage: p [IA] : IA [FreeTextEntry4] : endometrial adenocarcinoma [NTNM] : 0 [TTNM] : 1a [MTNM] : n/a

## 2022-03-19 NOTE — LETTER CLOSING
[Consult Closing:] : Thank you for allowing me to participate in the care of this patient.  If you have any questions, please do not hesitate to contact me. [Sincerely yours,] : Sincerely yours, [FreeTextEntry3] : Yogesh Menjivar MD\par Physician in Chief\par Department of Radiation Medicine\par [facility] [place]\par [facility] [place]\par \par  of Radiation Medicine\par [facility]\par at  [facility]\par \par Radiation \par [facility]/\par [facility] [place]\par [address]\par [place], [place] [address]\par \par Tel: [phone]\par Fax: [phone]\par

## 2022-03-19 NOTE — PHYSICAL EXAM
[Normal] : normoactive bowel sounds, soft and nontender, no hepatosplenomegaly or masses appreciated [FreeTextEntry1] : deferred [de-identified] : deferred

## 2022-03-19 NOTE — OB/GYN HISTORY
[___] : Full Term: [unfilled] [History of Hormone Replacement Therapy] : no history of hormone replacement therapy

## 2022-03-19 NOTE — REVIEW OF SYSTEMS
[Negative] : Endocrine [FreeTextEntry8] : status post radical hysterectomy for endometrial cancer [de-identified] : thyroid nodules

## 2022-03-19 NOTE — LETTER GREETING
[Dear Doctor] : Dear Doctor, [Consult Letter:] : Your patient, [unfilled] was seen in my office today for consultation. [Please see my note below.] : Please see my note below. [FreeTextEntry2] : Canelo Horne MD

## 2022-03-19 NOTE — HISTORY OF PRESENT ILLNESS
[FreeTextEntry1] : This 56 year-old female presents for radiation medicine consultation accompanied by her  Stevo.  Ms. Howe was diagnosed with endometrial cancer in December 2021.  She reported irregular bleeding (irregular menses and several 6-week episodes of continual bleeding) over the past year.  She underwent EMC on 12/14/21 with Dr. Lula Horne with pathology revealing endometrial adenocarcinoma FIGO2 vs. high grade.  She then saw Dr. Canelo Horne who recommended surgery.\par \par Staging PET/CT  2/3/22\par FDG avid uterine focus, compatible with the given diagnosis of endometrial carcinoma.  Small mildly FDG avid focus in the vaginal introitus, slightly to the left of the midline.  FDG avid right lower thyroid lobe focus. FDG avid focus in the subcarinal region, possibly a small lymph node versus mid esophagus.  Focal FDG avidity in the posterior nasopharyngeal and bilateral tonsillar regions, possibly infectious/inflammatory.  Several FDG-avid bilateral level IIa and left level II cervical nodes, likely reactive. \par \par 2/8/22 \par DaVinci Robot Assisted Total Laparoscopic Hysterectomy, Bilateral Salpingo-oophorectomy, Bilateral Pelvic and Para-aortic [place] Lymph Node Dissection, Cystoscopy and pelvic Washings. Pathology c/w endometrioid adenocarcinoma, FIGO grade 1 to focally grade 2 and a tiny focus of grade 3. The carcinoma invades the myometrium superficially. Adenomyosis. Adenomyoma. Right fallopian tube with no significant histopathology.  The carcinoma is present within the lumen of the left fallopian tube. Negative margins.  No loss of nuclear expression of MMR proteins. ER positive, MD patchy positive. \par \par \par Thyroid U/S 2/16/2022\par ) Right Lobe: 5.9 cm x 2.4 cm x 1.8 cm. Heterogeneous in echotexture. Upper pole cyst, measuring 0.8 x 0.4 x 0.5 cm. Ill-defined hypoechoic nodule in the upper pole, measuring 0.7 x 0.5 x 0.6 cm (TI-RAD 4).\par 2) Left Lobe: 5.8 cm x 1.8 cm x 2.1 cm. Heterogeneous in echotexture. Hypoechoic nodule in the anterior upper pole, measuring 0.6 x 0.3 x 0.5 cm (TI-RAD 4). Posterior hypoechoic left thyroid nodule, measuring 0.4 x 0.3 x 0.6 cm (TI-RAD 4).\par 3) Isthmus: 4 mm.\par 4) Cervical Lymph Nodes: No enlarged or abnormal morphology cervical nodes.\par IMPRESSION: Subcentimeter bilateral thyroid nodules (TI-RAD 4).\par TI-RAD 4: Moderately suspicious (FNA if > 1.5 cm, Follow if > 1 cm)\par \par \par \par 2/8/22 Dr. Horne performed a DaVinci Robot Assisted Total Laparoscopic Hysterectomy, Bilateral Salpingo-oophorectomy, Bilateral Pelvic and Para-aortic [place] Lymph Node Dissection, Cystoscopy and pelvic Washings. Pathology c/w endometrioid adenocarcinoma, FIGO grade 1 to focally grade 2 and a tiny focus of grade 3. The carcinoma invades the myometrium superficially. Adenomyosis. Adenomyoma. Right fallopian tube with no significant histopathology. The carcinoma is present within the lumen of the left fallopian tube. Negative margins. No loss of nuclear expression of MMR proteins. ER positive, MD patchy positive. \par \par TNM stage: T1a, N0 \par

## 2022-03-22 PROBLEM — E04.2 MULTIPLE THYROID NODULES: Status: ACTIVE | Noted: 2022-03-10

## 2022-03-22 LAB — HE4X: 47.8 PMOL/L

## 2022-03-26 NOTE — HISTORY OF PRESENT ILLNESS
[de-identified] : Ms. SHANIQUA JUAREZ  is a 56 year  old female  presenting for an initial consultation for evaluation of thyroid nodules, referred by Dr. Canelo Horne. \par \par Patient was diagnosed with endometrial cancer in 12/2021 and underwent an initial staging PET CT on 2/3/2022 which revealed an FDG avid right lower thyroid lobe focus (see additional PET results below).  She is s/p AIDA-BSO under the care of Dr. Horne on 2/8/2022. PATH: pT1a pN0 Endometrial CA. \par \par Patient underwent a thyroid/parathyroid US on 2/16/2022 with the following findings: \par 1) Right Lobe: 5.9 cm x 2.4 cm x 1.8 cm. Heterogeneous in echotexture. Upper pole cyst, measuring 0.8 x 0.4 x 0.5 cm. Ill-defined hypoechoic nodule in the upper pole, measuring 0.7 x 0.5 x 0.6 cm (TI-RAD 4).\par 2) Left Lobe: 5.8 cm x 1.8 cm x 2.1 cm. Heterogeneous in echotexture. Hypoechoic nodule in the anterior upper pole, measuring 0.6 x 0.3 x 0.5 cm (TI-RAD 4). Posterior hypoechoic left thyroid nodule, measuring 0.4 x 0.3 x 0.6 cm (TI-RAD 4).\par 3) Isthmus: 4 mm.\par 4) Cervical Lymph Nodes: No enlarged or abnormal morphology cervical nodes.\par IMPRESSION: Subcentimeter bilateral thyroid nodules (TI-RAD 4).\par TI-RAD 4: Moderately suspicious (FNA if > 1.5 cm, Follow if > 1 cm)\par \par PET CT 2/3/2022: IMPRESSION: FDG-PET/CT scan:\par 1.) FDG avid uterine focus, compatible with the given diagnosis of endometrial carcinoma.\par 2.) Small mildly FDG avid focus in the vaginal introitus, slightly to the left of the midline. Please correlate clinically.\par 3). FDG avid right lower thyroid lobe focus. Consider further evaluation with thyroid sonogram.\par 4). FDG avid focus in the subcarinal region, possibly a small lymph node versus mid esophagus. Further evaluation on follow-up imaging.\par 5.) Focal FDG avidity in the posterior nasopharyngeal and bilateral tonsillar regions, possibly infectious/inflammatory. Please correlate clinically and with direct visualization. Several FDG-avid bilateral level IIa and left level II cervical nodes, likely reactive.

## 2022-03-26 NOTE — CONSULT LETTER
[Dear  ___] : Dear  [unfilled], [Consult Letter:] : I had the pleasure of evaluating your patient, [unfilled]. [Please see my note below.] : Please see my note below. [Consult Closing:] : Thank you very much for allowing me to participate in the care of this patient.  If you have any questions, please do not hesitate to contact me. [Sincerely,] : Sincerely, [FreeTextEntry3] : Porfirio Leiva MD, MPH, FACS, FSSO\par , [facility]\par [facility] [place]\par Associate Professor of Surgery\par [employer] [facility]

## 2022-03-26 NOTE — ASSESSMENT
[FreeTextEntry1] : Ms. SHANIQUA JUAREZ  is a 56 year  old female who was diagnosed with endometrial cancer in 12/2021 and underwent an initial staging PET CT on 2/3/2022 which revealed an FDG avid right lower thyroid lobe focus.  She is s/p AIDA-BSO under the care of Dr. Horne on 2/8/2022. PATH: pT1a pN0 Endometrial CA. \par \par Post op, she underwent a thyroid/parathyroid US on 2/16/2022 with the following findings: \par 1) Right Lobe: 5.9 cm x 2.4 cm x 1.8 cm. Heterogeneous in echotexture. Upper pole cyst, measuring 0.8 x 0.4 x 0.5 cm. Ill-defined hypoechoic nodule in the upper pole, measuring 0.7 x 0.5 x 0.6 cm (TI-RAD 4).\par 2) Left Lobe: 5.8 cm x 1.8 cm x 2.1 cm. Heterogeneous in echotexture. Hypoechoic nodule in the anterior upper pole, measuring 0.6 x 0.3 x 0.5 cm (TI-RAD 4). Posterior hypoechoic left thyroid nodule, measuring 0.4 x 0.3 x 0.6 cm (TI-RAD 4).\par 3) Isthmus: 4 mm.\par 4) Cervical Lymph Nodes: No enlarged or abnormal morphology cervical nodes.\par IMPRESSION: Subcentimeter bilateral thyroid nodules (TI-RAD 4).\par TI-RAD 4: Moderately suspicious (FNA if > 1.5 cm, Follow if > 1 cm)\par \par \par PLAN:\par 1) No biopsy needed at this time given the small size of these thyroid nodules\par 2) Would repeat neck US in 9/2022 and if growth send for FNA\par 3) RTO after imaging

## 2022-03-26 NOTE — PHYSICAL EXAM
[Normal] : supple, no neck mass and thyroid not enlarged [Normal Neck Lymph Nodes] : normal neck lymph nodes  [Normal Supraclavicular Lymph Nodes] : normal supraclavicular lymph nodes [Normal Groin Lymph Nodes] : normal groin lymph nodes [Normal Axillary Lymph Nodes] : normal axillary lymph nodes [Normal] : oriented to person, place and time, with appropriate affect [de-identified] : soft NT ND; no masses or tracheal deviation appreciated

## 2022-03-31 PROCEDURE — 77263 THER RADIOLOGY TX PLNG CPLX: CPT

## 2022-04-01 ENCOUNTER — APPOINTMENT (OUTPATIENT)
Dept: HEMATOLOGY ONCOLOGY | Facility: CLINIC | Age: 57
End: 2022-04-01

## 2022-04-04 ENCOUNTER — APPOINTMENT (OUTPATIENT)
Age: 57
End: 2022-04-04

## 2022-04-12 ENCOUNTER — APPOINTMENT (OUTPATIENT)
Dept: HEMATOLOGY ONCOLOGY | Facility: CLINIC | Age: 57
End: 2022-04-12

## 2022-04-13 PROCEDURE — 77290 THER RAD SIMULAJ FIELD CPLX: CPT | Mod: 26

## 2022-04-13 PROCEDURE — 77334 RADIATION TREATMENT AID(S): CPT | Mod: 26

## 2022-04-20 PROCEDURE — 77332 RADIATION TREATMENT AID(S): CPT | Mod: 26

## 2022-04-20 PROCEDURE — 57156 INS VAG BRACHYTX DEVICE: CPT

## 2022-04-20 PROCEDURE — 77770 HDR RDNCL NTRSTL/ICAV BRCHTX: CPT | Mod: 26

## 2022-04-22 ENCOUNTER — APPOINTMENT (OUTPATIENT)
Dept: HEMATOLOGY ONCOLOGY | Facility: CLINIC | Age: 57
End: 2022-04-22

## 2022-04-25 ENCOUNTER — APPOINTMENT (OUTPATIENT)
Age: 57
End: 2022-04-25

## 2022-04-27 ENCOUNTER — OUTPATIENT (OUTPATIENT)
Dept: OUTPATIENT SERVICES | Facility: HOSPITAL | Age: 57
LOS: 1 days | Discharge: HOME | End: 2022-04-27

## 2022-04-27 DIAGNOSIS — Z98.890 OTHER SPECIFIED POSTPROCEDURAL STATES: Chronic | ICD-10-CM

## 2022-04-27 DIAGNOSIS — C54.1 MALIGNANT NEOPLASM OF ENDOMETRIUM: ICD-10-CM

## 2022-04-27 PROCEDURE — 77770 HDR RDNCL NTRSTL/ICAV BRCHTX: CPT | Mod: 26

## 2022-04-27 PROCEDURE — 57156 INS VAG BRACHYTX DEVICE: CPT

## 2022-04-27 PROCEDURE — 77332 RADIATION TREATMENT AID(S): CPT | Mod: 26

## 2022-05-03 ENCOUNTER — APPOINTMENT (OUTPATIENT)
Dept: HEMATOLOGY ONCOLOGY | Facility: CLINIC | Age: 57
End: 2022-05-03
Payer: COMMERCIAL

## 2022-05-03 VITALS
HEIGHT: 64 IN | BODY MASS INDEX: 31.24 KG/M2 | OXYGEN SATURATION: 99 % | SYSTOLIC BLOOD PRESSURE: 140 MMHG | WEIGHT: 183 LBS | DIASTOLIC BLOOD PRESSURE: 85 MMHG | HEART RATE: 88 BPM

## 2022-05-03 PROCEDURE — 99213 OFFICE O/P EST LOW 20 MIN: CPT

## 2022-05-04 PROCEDURE — 77770 HDR RDNCL NTRSTL/ICAV BRCHTX: CPT | Mod: 26

## 2022-05-04 PROCEDURE — 57156 INS VAG BRACHYTX DEVICE: CPT

## 2022-05-04 PROCEDURE — 77332 RADIATION TREATMENT AID(S): CPT | Mod: 26

## 2022-05-04 NOTE — RESULTS/DATA
[FreeTextEntry1] : \par Specimen(s) Submitted\par 1  Paraaortic sentinel lymph node\par 2  Right pelvic sentinel lymph node\par 3  Left pelvic sentinel lymph node\par 4  Uterus, cervix, bilateral tubes, bilateral ovaries\par \par Final Diagnosis\par 1. Para-aortic sentinel lymph node, sentinel lymph node dissection:\par \par - One lymph node, negative for carcinoma (0/1).\par - Immunohistochemical stain for pancytokeratin is performed on block\par 1-A and is negative.\par \par 2. Right pelvic sentinel lymph node, sentinel lymph node dissection:\par - One lymph node, negative for carcinoma (0/1).\par - Immunohistochemical stain for pancytokeratin is performed on block\par 2-A and is negative.\par \par 3. Left pelvic sentinel lymph node, sentinel lymph node dissection:\par - Two lymph nodes, negative for carcinoma (0/2).\par - Immunohistochemical stain for pancytokeratin is performed on block\par 3-A and is negative.\par \par 4. Uterus, cervix, bilateral fallopian tubes and ovaries, hysterectomy and\par bilateral salpingo-oophorectomy:\par - Endometrioid adenocarcinoma, FIGO grade 1 to focally grade 2 and a\par tiny focus of grade 3 ( slide 4-K)\par - The carcinoma invades the myometrium superficially.\par - Adenomyosis.\par - Adenomyoma.\par - Right fallopian tube with no significant histopathology.\par - The carcinoma is present within the lumen of the left fallopian\par tube.\par - Right ovary with inclusion and mesothelial lined cysts.\par - Left ovary with findings suggestive of endometriosis, inclusion and\par mesothelial lined cysts.\par - Cervix with chronic inflammation.\par - Please refer to synoptic report/note below.\par \par Note: Immunohistochemical stains for ER, MS, P16 and P53 were performed on\par block 4-K at [facility] and interpreted at [facility]\par as follows:\par \par p16, p53, ER and MS: Inconclusive\par \par Case was seen in consultation with Dr. Blaine Mejia at [place], [place].\par \par The report is attached and on file in the Department of Pathology.  \par \par Pelvic wash is negative. \par \par \par MLH1:   Intact nuclear expression\par MSH2:   Intact nuclear expression\par MSH6:   Intact nuclear expression\par PMS2:   Intact nuclear expression\par \par 1/19/22 EMB \par Final Diagnosis\par Slides for review (endometrium, curettage):\par -Adenocarcinoma of endometrial origin (see Note)\par \par Note:\par \par The tumor is  Grade 1  (architecture), Grade 3 (nuclear features).\par Differential diagnosis includes a mixed endometrioid /serous carcinoma vs\par an endometrioid adenocarcinoma, FIGO 2\par Cannot exclude  POLE, p53 or MMR abnormalities in this tumor.

## 2022-05-04 NOTE — ASSESSMENT
[FreeTextEntry1] : The patient is a 56 year old woman with no significant past medical history with newly diagnosed endometrial cancer\par \par # Endometrial Cancer \par - stage IA, endometrioid type, FIGO grade 1-2 with focal areas of grade 3. This was corroborated by second opinion as [facility]. MMR-intact\par - Of note, endometrial biopsy from 1/19/22 showed features concerning for serous carcinoma, which is a higher grade tumor. There were no serous features identified on the final pathology report or after review from second opinion, however. Case was discussed in tumor board. Given discrepancy in tumor grades, consensus of the tumor board was to pursue treatment with chemotherapy and radiation. The patient sought a second opinion, where the pathology was also reviewed. She ultimately chose not to pursue chemotherapy. \par - Patient now finishing with RT. \par - All questions answered. Patient will follow up with Dr. Menjivar and Dr. Horne for surveillance. \par - follow up with medical oncology PRN. \par

## 2022-05-04 NOTE — PHYSICAL EXAM
[Fully active, able to carry on all pre-disease performance without restriction] : Status 0 - Fully active, able to carry on all pre-disease performance without restriction [Thin] : thin [Normal] : normoactive bowel sounds, soft and nontender, no hepatosplenomegaly or masses appreciated [de-identified] : surgical scars, well healed

## 2022-05-04 NOTE — HISTORY OF PRESENT ILLNESS
[T: ___] : T[unfilled] [N: ___] : N[unfilled] [de-identified] : 57 yo F with no known PMH who was diagnosed with endometrial cancer in December 2021.\par \par She reported irregular bleeding over the past year. She underwent EMC on 12/14/21 with Dr. Lula Horne with pathology revealing endometrial adenocarcinoma FIGO2 vs. high grade.  She next saw Dr. Canelo Horne who repeated the biopsy, which showed high grade carcinoma (endometrioid grade II vs. serous carcinoma) Staging PET on 2/3/22 showed FDG avid uterine focus, compatible with the given diagnosis of endometrial carcinoma. Small mildly FDG avid focus in the vaginal introitus, slightly to the left of the midline. FDG avid right lower thyroid lobe focus. FDG avid focus in the subcarinal region, possibly a small lymph node versus mid esophagus. Focal FDG avidity in the posterior nasopharyngeal and bilateral tonsillar regions, possibly infectious/inflammatory. Several FDG-avid bilateral level IIa and left level II cervical nodes, likely reactive. Follow up thyroid U/S showed subcentimeter bilateral thyroid nodules (TI-RAD 4).  Moderately suspicious. \par \par On 2/8/22 Dr. Horne performed a DaVinci Robot Assisted Total Laparoscopic Hysterectomy, Bilateral Salpingo-oophorectomy, Bilateral Pelvic and Para-aortic [employer] Lymph Node Dissection, Cystoscopy and pelvic Washings.  Pathology c/w endometrioid adenocarcinoma, FIGO grade 1 to focally grade 2 and a tiny focus of grade 3. The carcinoma invades the myometrium superficially. Adenomyosis. Adenomyoma. Right fallopian tube with no significant histopathology. The carcinoma is present within the lumen of the left fallopian tube. Negative margins. No loss of nuclear expression of MMR proteins. ER positive, NM patchy positive. \par \par Case was discussed in TB and decision was to recommend  chemotherapy and radiation. However, after consultation at [facility], and [facility], the patient decided to only proceed with treatment with VBRT for stage IA endometrial cancer, grade III.   [de-identified] : The patient feels well, and states that she is almost finished with radiation therapy. She has some abdominal discomfort, but denies nausea, vomiting, diarrhea

## 2022-05-13 ENCOUNTER — APPOINTMENT (OUTPATIENT)
Dept: HEMATOLOGY ONCOLOGY | Facility: CLINIC | Age: 57
End: 2022-05-13

## 2022-05-16 ENCOUNTER — APPOINTMENT (OUTPATIENT)
Age: 57
End: 2022-05-16

## 2022-05-24 ENCOUNTER — APPOINTMENT (OUTPATIENT)
Dept: HEMATOLOGY ONCOLOGY | Facility: CLINIC | Age: 57
End: 2022-05-24

## 2022-06-08 ENCOUNTER — APPOINTMENT (OUTPATIENT)
Dept: GYNECOLOGIC ONCOLOGY | Facility: CLINIC | Age: 57
End: 2022-06-08
Payer: COMMERCIAL

## 2022-06-08 VITALS
RESPIRATION RATE: 16 BRPM | BODY MASS INDEX: 31.24 KG/M2 | DIASTOLIC BLOOD PRESSURE: 84 MMHG | WEIGHT: 183 LBS | OXYGEN SATURATION: 99 % | HEIGHT: 64 IN | SYSTOLIC BLOOD PRESSURE: 133 MMHG | HEART RATE: 58 BPM

## 2022-06-08 PROCEDURE — 99212 OFFICE O/P EST SF 10 MIN: CPT

## 2022-06-08 NOTE — REASON FOR VISIT
[FreeTextEntry1] : [place] Location \par \par [facility] [place]. [phone]\par [address] [place] [address] \par \par -Newly diagnosed endometrial cancer. \par -S/p RA TLH BSO. b/l PPASLND, cysto, PW on 2/8/22. \par -Seen post operatively 2/23/22. \par -Recommended consult w/ Dr. Leiva for thyroid findings. Plan to repeat neck US 9/2022\par -Discussed Stage 1A grade vague. . \par -TMB recommendation best tx for pt: Adjuvant therapy w/ systemic chemo and vaginal brachytherapy. \par -Pt had second opinion & ultimately decided not to pursue with chemotherapy but has now completed RT\par -Routine svl visit.

## 2022-06-08 NOTE — ASSESSMENT
[FreeTextEntry1] : Patient is doing well from a gynecological stand point. \par \par I advised patient that I am moving out of [facility] to [place] and explained what her follow up care should be moving forward for patient. Patient will follow up in September for a routine svl exam with NEO Gil.

## 2022-06-08 NOTE — PHYSICAL EXAM
[Chaperone Present] : A chaperone was present in the examining room during all aspects of the physical examination [Absent] : Adnexa(ae): Absent [Normal] : Bimanual Exam: Normal [FreeTextEntry1] : Mae Qureshi Medical assistant chaperoned during gynecologic exam.

## 2022-06-23 ENCOUNTER — APPOINTMENT (OUTPATIENT)
Dept: RADIATION ONCOLOGY | Facility: CLINIC | Age: 57
End: 2022-06-23

## 2022-06-23 NOTE — DISEASE MANAGEMENT
[Clinical] : TNM Stage: c [FreeTextEntry4] : endometrial cancer [TTNM] : 1a [NTNM] : 0 [MTNM] : n/a [IA] : IA [de-identified] : 2100 cGy [de-identified] : vaginal cuff

## 2022-06-23 NOTE — HISTORY OF PRESENT ILLNESS
[FreeTextEntry1] : This 56 year-old female is conferencing for post-treatment evaluation.  Ms. Tierney is an otherwise healthy woman with a stage one endometrial cancer, mostly grade I, but small foci of grade 2 and 3 as well.  She completed radiation brachytherapy tot the vaginal cuff on 5/4/2022.\par \par At last treatment:\par Denied pain, dysuria, vaginal discharge or spotting, diarrhea.  Noted she is sexually active.

## 2022-07-08 ENCOUNTER — RESULT REVIEW (OUTPATIENT)
Age: 57
End: 2022-07-08

## 2022-09-15 ENCOUNTER — APPOINTMENT (OUTPATIENT)
Dept: SURGICAL ONCOLOGY | Facility: CLINIC | Age: 57
End: 2022-09-15

## 2022-09-15 NOTE — ASSESSMENT
[FreeTextEntry1] : Ms. SHANIQUA JUAREZ  is a 56 year  old female who was diagnosed with endometrial cancer in 12/2021 and underwent an initial staging PET CT on 2/3/2022 which revealed an FDG avid right lower thyroid lobe focus.  She is s/p AIDA-BSO under the care of Dr. Horne on 2/8/2022. PATH: pT1a pN0 Endometrial CA. \par \par Post op, she underwent a thyroid/parathyroid US on 2/16/2022 with the following findings: \par 1) Right Lobe: 5.9 cm x 2.4 cm x 1.8 cm. Heterogeneous in echotexture. Upper pole cyst, measuring 0.8 x 0.4 x 0.5 cm. Ill-defined hypoechoic nodule in the upper pole, measuring 0.7 x 0.5 x 0.6 cm (TI-RAD 4).\par 2) Left Lobe: 5.8 cm x 1.8 cm x 2.1 cm. Heterogeneous in echotexture. Hypoechoic nodule in the anterior upper pole, measuring 0.6 x 0.3 x 0.5 cm (TI-RAD 4). Posterior hypoechoic left thyroid nodule, measuring 0.4 x 0.3 x 0.6 cm (TI-RAD 4).\par 3) Isthmus: 4 mm.\par 4) Cervical Lymph Nodes: No enlarged or abnormal morphology cervical nodes.\par IMPRESSION: Subcentimeter bilateral thyroid nodules (TI-RAD 4).\par TI-RAD 4: Moderately suspicious (FNA if > 1.5 cm, Follow if > 1 cm)\par \par \par PLAN:\par

## 2022-09-15 NOTE — CONSULT LETTER
[Dear  ___] : Dear  [unfilled], [Consult Letter:] : I had the pleasure of evaluating your patient, [unfilled]. [Please see my note below.] : Please see my note below. [Consult Closing:] : Thank you very much for allowing me to participate in the care of this patient.  If you have any questions, please do not hesitate to contact me. [Sincerely,] : Sincerely, [FreeTextEntry3] : Porfirio Leiva MD, MPH, FACS, FSSO\par , [facility] Surgical Oncology [facility]\par [facility]\par Associate Professor of Surgery\par [employer] at [facility]

## 2022-09-15 NOTE — HISTORY OF PRESENT ILLNESS
[de-identified] : ***Patient will reschedule her appointment after she goes for follow up thyroid ultrasound******\par \par Ms. SHANIQUA JUAREZ  is a 57 year old female presenting for a follow up visit.  She was initially seen in consultation on 3/17/22 for evaluation of thyroid nodules, referred by Dr. Canelo Horne. \par \par Patient was diagnosed with endometrial cancer in 12/2021 and underwent an initial staging PET CT on 2/3/2022 which revealed an FDG avid right lower thyroid lobe focus.  She is s/p AIDA-BSO under the care of Dr. Horne on 2/8/2022. PATH: pT1a pN0 Endometrial CA. \par \par Patient underwent a thyroid/parathyroid US on 2/16/2022 with the following findings: \par 1) Right Lobe: 5.9 cm x 2.4 cm x 1.8 cm. Heterogeneous in echotexture. Upper pole cyst, measuring 0.8 x 0.4 x 0.5 cm. Ill-defined hypoechoic nodule in the upper pole, measuring 0.7 x 0.5 x 0.6 cm (TI-RAD 4).\par 2) Left Lobe: 5.8 cm x 1.8 cm x 2.1 cm. Heterogeneous in echotexture. Hypoechoic nodule in the anterior upper pole, measuring 0.6 x 0.3 x 0.5 cm (TI-RAD 4). Posterior hypoechoic left thyroid nodule, measuring 0.4 x 0.3 x 0.6 cm (TI-RAD 4).\par 3) Isthmus: 4 mm.\par 4) Cervical Lymph Nodes: No enlarged or abnormal morphology cervical nodes.\par IMPRESSION: Subcentimeter bilateral thyroid nodules (TI-RAD 4).\par \par 3/17/22- We discussed that no biopsy needed at this time given the small size of these thyroid nodules and I recommended repeat neck US in 9/2022 and if growth send for FNA\par \par \par

## 2022-09-15 NOTE — PHYSICAL EXAM
[Normal] : supple, no neck mass and thyroid not enlarged [Normal Neck Lymph Nodes] : normal neck lymph nodes  [Normal Supraclavicular Lymph Nodes] : normal supraclavicular lymph nodes [Normal Groin Lymph Nodes] : normal groin lymph nodes [Normal Axillary Lymph Nodes] : normal axillary lymph nodes [Normal] : oriented to person, place and time, with appropriate affect [de-identified] : soft NT ND; no masses or tracheal deviation appreciated

## 2022-09-15 NOTE — REASON FOR VISIT
[Initial Consultation] : an initial consultation for [Thyroid Nodule] : thyroid nodule [Follow-Up Visit] : a follow-up visit for [FreeTextEntry2] : thyroid nodule

## 2022-09-16 NOTE — CHIEF COMPLAINT
[FreeTextEntry1] : [facility]\par [address]\[place] [address]\par [phone]\par \par SVL- Endometrial cancer

## 2022-09-16 NOTE — CHIEF COMPLAINT
[FreeTextEntry1] : [facility]\par [address]\[place] [address]\par [phone]\par \par SVL- Endometrial cancer

## 2022-09-18 NOTE — REASON FOR VISIT
[FreeTextEntry1] : [facility]\par [address]\[place] [address]\par [phone]\par \par SVL- Endometrial cancer

## 2022-09-18 NOTE — HISTORY OF PRESENT ILLNESS
[FreeTextEntry1] : 58 yo with endometrial cancer s/p RA TLH, BSO, B/L PPALND, cystoscopy, PW on 2/8/2022. It was discussed at her initial post-operative visit that stage 1A was vague, as there was small focus of grade 2 and grade 3 seen as well. Her case was discussed at [facility] with a recommendation for adjuvant therapy with systemic chemo and vaginal brachytherapy. Patient sought a second opinion and ultimately decided not to pursue chemotherapy. She is now s/p radiation brachytherapy to vaginal cuff therapy with Dr. Menjivar, completed on 5/4/2022. She presents to office today for routine surveillance examination. She reports doing well from a gynecologic standpoint. Denies vaginal bleeding or discharge, abdominopelvic pain, change in appetite, change in bladder or bowel habits.\par \par Of note, patient follows with Dr. Porfirio Leiva for thyroid nodules. \par \par Last vpap: 7/8/2022- WNL\par Last mammo: \par Last colo:\par Last DEXA:

## 2022-09-18 NOTE — HISTORY OF PRESENT ILLNESS
[FreeTextEntry1] : 58 yo with endometrial cancer s/p RA TLH, BSO, B/L PPALND, cystoscopy, PW on 2/8/2022. It was discussed at her initial post-operative visit that stage 1A was vague, as there was small focus of grade 2 and grade 3 seen as well. Her case was discussed at [facility] with a recommendation for adjuvant therapy with systemic chemo and vaginal brachytherapy. Patient sought a second opinion and ultimately decided not to pursue chemotherapy. She is now s/p radiation brachytherapy to vaginal cuff therapy with Dr. Menjivar, completed on 5/4/2022. She presents to office today for routine surveillance examination. She reports doing well from a gynecologic standpoint. Denies vaginal bleeding or discharge, abdominopelvic pain, change in appetite, change in bladder or bowel habits.\par \par Of note, patient follows with Dr. Porfirio Leiva for thyroid nodules. \par \par Last vpap: 7/8/2022- WNL\par Last mammo: \par Last colo:\par Last DEXA:

## 2022-09-18 NOTE — REASON FOR VISIT
[FreeTextEntry1] : [facility]\par [address]\[place] [address]\par [phone]\par \par SVL- Endometrial cancer

## 2022-09-19 ENCOUNTER — APPOINTMENT (OUTPATIENT)
Dept: GYNECOLOGIC ONCOLOGY | Facility: CLINIC | Age: 57
End: 2022-09-19

## 2022-09-29 ENCOUNTER — APPOINTMENT (OUTPATIENT)
Dept: RADIATION ONCOLOGY | Facility: CLINIC | Age: 57
End: 2022-09-29

## 2022-09-29 VITALS
RESPIRATION RATE: 16 BRPM | WEIGHT: 184 LBS | HEART RATE: 105 BPM | OXYGEN SATURATION: 95 % | BODY MASS INDEX: 31.58 KG/M2 | SYSTOLIC BLOOD PRESSURE: 138 MMHG | DIASTOLIC BLOOD PRESSURE: 85 MMHG

## 2022-09-29 PROCEDURE — 99213 OFFICE O/P EST LOW 20 MIN: CPT

## 2022-10-01 NOTE — REASON FOR VISIT
[Endometrial Cancer] : endometrial cancer [Routine On-Treatment] : a routine on-treatment visit for [Other: _____] : [unfilled]

## 2022-10-01 NOTE — VITALS
[Least Pain Intensity: 0/10] : 0/10 [NoTreatment Scheduled] : no treatment scheduled [Maximal Pain Intensity: 1/10] : 1/10 [Pain Description/Quality: ___] : Pain description/quality: [unfilled] [Pain Duration: ___] : Pain duration: [unfilled] [90: Able to carry normal activity; minor signs or symptoms of disease.] : 90: Able to carry normal activity; minor signs or symptoms of disease.  [ECOG Performance Status: 1 - Restricted in physically strenuous activity but ambulatory and able to carry out work of a light or sedentary nature] : Performance Status: 1 - Restricted in physically strenuous activity but ambulatory and able to carry out work of a light or sedentary nature, e.g., light house work, office work [Pain Location: ___] : Pain Location: [unfilled] [Pain Interferes with ADLs] : Pain does not interfere with activities of daily living

## 2022-10-01 NOTE — REVIEW OF SYSTEMS
[Negative] : Allergic/Immunologic [FreeTextEntry8] : status post radical hysterectomy and brachytherapy for endometrial cancer [de-identified] : thyroid nodules

## 2022-10-01 NOTE — LETTER CLOSING
[Sincerely yours,] : Sincerely yours, [FreeTextEntry3] : Yogesh Menjivar MD\par Physician in Chief\par Department of Radiation Medicine\par [facility]\par [facility] [place]\par \par  of Radiation Medicine\par [employer] [facility]\par at  [facility]\par \par Radiation \par [facility]/\par [facility] [place]\par [address]\par [place], [place] [address]\par \par Tel: [phone]\par Fax: [phone]\par

## 2022-10-01 NOTE — HISTORY OF PRESENT ILLNESS
[FreeTextEntry1] : This 57 year-old female presents for routine follow-up. Ms. Tierney is an otherwise healthy woman with a stage one endometrial cancer, mostly grade I, but small foci of grade 2 and 3 as well.  She completed radiation brachytherapy (2100cGy/3fx)  to the vaginal cuff on 5/4/2022.\par \par Denies pain, dysuria, vaginal discharge or spotting, diarrhea. Notes she is sexually active. \par  \par

## 2022-10-01 NOTE — LETTER GREETING
[Dear Doctor] : Dear Doctor, [Consult Letter:] : Your patient, [unfilled] was seen in my office today for consultation. [Please see my note below.] : Please see my note below. [FreeTextEntry2] : Annabella Russo MD

## 2022-10-01 NOTE — PHYSICAL EXAM
[Normal] : oriented to person, place and time, the affect was normal, the mood was normal and not anxious [de-identified] : s/p hysterectomy , surgical absence of cervix no vaginal masses or ulcers . Some mild synechiae  in distal vaginal predominantly on left aspect .

## 2022-10-01 NOTE — DISEASE MANAGEMENT
[IA] : IA [Pathological] : TNM Stage: p [FreeTextEntry4] : endometrial adenocarcinoma [TTNM] : 1a [NTNM] : 0 [MTNM] : 0 [de-identified] : 2100 cGy [de-identified] : vaginal cuff

## 2022-10-01 NOTE — ASSESSMENT
[No evidence of disease] : No evidence of disease [FreeTextEntry1] : minimal  treatment related sequelae.

## 2022-10-16 ENCOUNTER — FORM ENCOUNTER (OUTPATIENT)
Age: 57
End: 2022-10-16

## 2022-12-05 ENCOUNTER — APPOINTMENT (OUTPATIENT)
Dept: GYNECOLOGIC ONCOLOGY | Facility: CLINIC | Age: 57
End: 2022-12-05

## 2022-12-05 VITALS
HEART RATE: 75 BPM | DIASTOLIC BLOOD PRESSURE: 88 MMHG | BODY MASS INDEX: 30.05 KG/M2 | OXYGEN SATURATION: 99 % | WEIGHT: 176 LBS | HEIGHT: 64 IN | SYSTOLIC BLOOD PRESSURE: 148 MMHG

## 2022-12-05 PROCEDURE — 99213 OFFICE O/P EST LOW 20 MIN: CPT

## 2022-12-05 RX ORDER — ENOXAPARIN SODIUM 100 MG/ML
40 INJECTION SUBCUTANEOUS
Qty: 28 | Refills: 0 | Status: DISCONTINUED | COMMUNITY
Start: 2022-02-10 | End: 2022-12-05

## 2022-12-05 RX ORDER — PROCHLORPERAZINE MALEATE 10 MG/1
10 TABLET ORAL EVERY 6 HOURS
Qty: 120 | Refills: 2 | Status: DISCONTINUED | COMMUNITY
Start: 2022-03-17 | End: 2022-12-05

## 2022-12-05 RX ORDER — ONDANSETRON 8 MG/1
8 TABLET ORAL EVERY 8 HOURS
Qty: 90 | Refills: 0 | Status: DISCONTINUED | COMMUNITY
Start: 2022-03-17 | End: 2022-12-05

## 2022-12-05 NOTE — PHYSICAL EXAM
[Chaperone Present] : A chaperone was present in the examining room during all aspects of the physical examination [Absent] : Adnexa(ae): Absent [Normal] : Bimanual Exam: Normal [FreeTextEntry1] : Giulia Lomas Medical assistant chaperoned during gynecologic exam.

## 2022-12-05 NOTE — REASON FOR VISIT
[FreeTextEntry1] : [place] Location \par \par [facility] [place]. [phone]\par [address] [place] [address]

## 2022-12-05 NOTE — HISTORY OF PRESENT ILLNESS
[FreeTextEntry1] : This 57 yo with newly diagnosed endometrial cancer is s/p RA TLH BSO, b/l PPASLND, cysto, PW on 2/8/22. She was seen post operatively on 2/23/22 where we discussed that her Stage 1A grade was vague. I advised the patient that I wanted to present her case at our tumor board meeting to discuss the best treatment plan for patient. TMB recommendation best tx for pt: Adjuvant therapy w/ systemic chemo and vaginal brachytherapy. The patient sought a second opinion, where the pathology was also reviewed. She ultimately chose not to pursue chemotherapy but has now completed radiation. She returns today for a routine svl visit. Denies any pain or VB. Feels well from a gynecological stand point. Denies new medical or surgical history.\par \par Of note, patient follows with Dr. Leiva for thyroid nodules. Recommended for repeat neck US 9/2022, she did not go for testing yet but agrees to do so within the next month or so and will reschedule follow-up with Dr. Leiva thereafter\par \par LMammo- overdue\par LColonoscopy- never\par LBone Density Scan- overdue\par

## 2022-12-05 NOTE — ASSESSMENT
[FreeTextEntry1] : Clinically GREGORIO\par \par Patient understands that colonoscopy is gold standard for colon cancer screening. Given referral for both GI physician and cologuard testing, she will determine what she is comfortable with. She also reports being overwhelmed with the number of appointments she needs for SVL. Discussed contacting Rad-Onc to see if necessary to have overlapping GYN evaluations. Otherwise pt to see us every 4 months for 2/3 visits a year and alternate with her GYN 1/3 times a year

## 2023-01-03 ENCOUNTER — APPOINTMENT (OUTPATIENT)
Dept: RADIATION ONCOLOGY | Facility: CLINIC | Age: 58
End: 2023-01-03

## 2023-02-02 ENCOUNTER — APPOINTMENT (OUTPATIENT)
Dept: RADIATION ONCOLOGY | Facility: CLINIC | Age: 58
End: 2023-02-02
Payer: COMMERCIAL

## 2023-02-02 VITALS
RESPIRATION RATE: 16 BRPM | OXYGEN SATURATION: 97 % | SYSTOLIC BLOOD PRESSURE: 142 MMHG | HEART RATE: 100 BPM | DIASTOLIC BLOOD PRESSURE: 84 MMHG

## 2023-02-02 DIAGNOSIS — T66.XXXA RADIATION SICKNESS, UNSPECIFIED, INITIAL ENCOUNTER: ICD-10-CM

## 2023-02-02 PROCEDURE — 99213 OFFICE O/P EST LOW 20 MIN: CPT

## 2023-02-02 NOTE — REASON FOR VISIT
[Routine On-Treatment] : a routine on-treatment visit for [Endometrial Cancer] : endometrial cancer [Other: _____] : [unfilled]

## 2023-02-08 NOTE — PHYSICAL EXAM
[Normal] : oriented to person, place and time, the affect was normal, the mood was normal and not anxious [de-identified] : s/p hysterectomy, surgical absence of cervix, no vaginal masses or ulcers.  Some mild synechiae in distal vagina. No vaginal dc or bleeding.

## 2023-02-08 NOTE — HISTORY OF PRESENT ILLNESS
[FreeTextEntry1] : This 57 year-old female presents for routine follow-up. Ms. Tierney is an otherwise healthy woman with a stage one endometrial cancer, mostly grade I, but small foci of grade 2 and 3 as well.  She completed radiation brachytherapy (2100cGy/3fx)  to the vaginal cuff on 5/4/2022.\par \par Denies pain, dysuria, vaginal discharge or spotting, diarrhea. Notes she is sexually active.  However, she avoids d/t pain after intercourse.   Advised to use vaginal dilator more frequently with adequate lubrication.  Begin pelvic floor dysfunction rehabilitation.\par  \par

## 2023-02-08 NOTE — LETTER CLOSING
[Sincerely yours,] : Sincerely yours, [FreeTextEntry3] : Yogesh Menjivar MD\par Physician in Chief\par Department of Radiation Medicine\par [facility]\par [facility] [place]\par \par  of Radiation Medicine\par [facility]\par at  [facility]\par \par Radiation \par [facility]/\par [facility] [place]\par [address]\par [place], [place] [address]\par \par Tel: [phone]\par Fax: [phone]\par

## 2023-02-08 NOTE — REVIEW OF SYSTEMS
[Negative] : Gastrointestinal [FreeTextEntry8] : status post radical hysterectomy and brachytherapy for endometrial cancer [de-identified] : thyroid nodules

## 2023-02-08 NOTE — VITALS
[Least Pain Intensity: 0/10] : 0/10 [NoTreatment Scheduled] : no treatment scheduled [ECOG Performance Status: 1 - Restricted in physically strenuous activity but ambulatory and able to carry out work of a light or sedentary nature] : Performance Status: 1 - Restricted in physically strenuous activity but ambulatory and able to carry out work of a light or sedentary nature, e.g., light house work, office work [Maximal Pain Intensity: 2/10] : 2/10 [Pain Description/Quality: ___] : Pain description/quality: [unfilled] [Pain Duration: ___] : Pain duration: [unfilled] [Pain Location: ___] : Pain Location: [unfilled] [Pain Interferes with ADLs] : Pain does not interfere with activities of daily living [80: Normal activity with effort; some signs or symptoms of disease.] : 80: Normal activity with effort; some signs or symptoms of disease.

## 2023-02-08 NOTE — DISEASE MANAGEMENT
[Pathological] : TNM Stage: p [IA] : IA [FreeTextEntry4] : endometrial adenocarcinoma [TTNM] : 1a [NTNM] : 0 [MTNM] : 0 [de-identified] : 2100 cGy [de-identified] : vaginal cuff

## 2023-04-03 ENCOUNTER — APPOINTMENT (OUTPATIENT)
Dept: GYNECOLOGIC ONCOLOGY | Facility: CLINIC | Age: 58
End: 2023-04-03

## 2023-04-12 ENCOUNTER — RESULT REVIEW (OUTPATIENT)
Age: 58
End: 2023-04-12

## 2023-04-20 ENCOUNTER — OUTPATIENT (OUTPATIENT)
Dept: OUTPATIENT SERVICES | Facility: HOSPITAL | Age: 58
LOS: 1 days | End: 2023-04-20

## 2023-04-20 ENCOUNTER — APPOINTMENT (OUTPATIENT)
Dept: CT IMAGING | Facility: CLINIC | Age: 58
End: 2023-04-20
Payer: COMMERCIAL

## 2023-04-20 DIAGNOSIS — C54.1 MALIGNANT NEOPLASM OF ENDOMETRIUM: ICD-10-CM

## 2023-04-20 DIAGNOSIS — Z92.3 PERSONAL HISTORY OF IRRADIATION: ICD-10-CM

## 2023-04-20 DIAGNOSIS — Z98.890 OTHER SPECIFIED POSTPROCEDURAL STATES: Chronic | ICD-10-CM

## 2023-04-20 PROCEDURE — 74177 CT ABD & PELVIS W/CONTRAST: CPT | Mod: 26

## 2023-05-11 ENCOUNTER — NON-APPOINTMENT (OUTPATIENT)
Age: 58
End: 2023-05-11

## 2023-05-24 ENCOUNTER — FORM ENCOUNTER (OUTPATIENT)
Age: 58
End: 2023-05-24

## 2023-09-13 ENCOUNTER — APPOINTMENT (OUTPATIENT)
Dept: RADIATION ONCOLOGY | Facility: CLINIC | Age: 58
End: 2023-09-13
Payer: COMMERCIAL

## 2023-09-13 VITALS
WEIGHT: 179 LBS | SYSTOLIC BLOOD PRESSURE: 132 MMHG | HEART RATE: 95 BPM | RESPIRATION RATE: 16 BRPM | OXYGEN SATURATION: 95 % | DIASTOLIC BLOOD PRESSURE: 82 MMHG | BODY MASS INDEX: 30.73 KG/M2

## 2023-09-13 DIAGNOSIS — E07.9 DISORDER OF THYROID, UNSPECIFIED: ICD-10-CM

## 2023-09-13 PROCEDURE — 99214 OFFICE O/P EST MOD 30 MIN: CPT

## 2023-09-17 PROBLEM — E07.9 LESION OF THYROID GLAND: Status: ACTIVE | Noted: 2022-02-15

## 2023-10-30 ENCOUNTER — OFFICE (OUTPATIENT)
Facility: LOCATION | Age: 58
Setting detail: OPHTHALMOLOGY
End: 2023-10-30

## 2023-10-30 ENCOUNTER — OFFICE (OUTPATIENT)
Facility: LOCATION | Age: 58
Setting detail: OPHTHALMOLOGY
End: 2023-10-30
Payer: COMMERCIAL

## 2023-10-30 DIAGNOSIS — H25.13: ICD-10-CM

## 2023-10-30 DIAGNOSIS — H53.001: ICD-10-CM

## 2023-10-30 DIAGNOSIS — H52.11: ICD-10-CM

## 2023-10-30 DIAGNOSIS — H31.29: ICD-10-CM

## 2023-10-30 DIAGNOSIS — H52.4: ICD-10-CM

## 2023-10-30 PROCEDURE — 92015 DETERMINE REFRACTIVE STATE: CPT | Performed by: OPHTHALMOLOGY

## 2023-10-30 PROCEDURE — 92250 FUNDUS PHOTOGRAPHY W/I&R: CPT | Performed by: OPHTHALMOLOGY

## 2023-10-30 PROCEDURE — 92004 COMPRE OPH EXAM NEW PT 1/>: CPT | Performed by: OPHTHALMOLOGY

## 2023-10-30 ASSESSMENT — CONFRONTATIONAL VISUAL FIELD TEST (CVF)
OD_FINDINGS: FULL
OS_FINDINGS: FULL

## 2023-10-30 ASSESSMENT — TONOMETRY
OS_IOP_MMHG: 15
OD_IOP_MMHG: 15

## 2023-10-31 PROBLEM — H31.29 PERIPAPILLARY ATROPHY ; BOTH EYES: Status: ACTIVE | Noted: 2023-10-30

## 2023-10-31 ASSESSMENT — REFRACTION_AUTOREFRACTION
OD_SPHERE: -1.75
OD_CYLINDER: +1.75
OD_AXIS: 057
OS_CYLINDER: +0.25
OS_AXIS: 152
OS_SPHERE: +0.25

## 2023-10-31 ASSESSMENT — REFRACTION_MANIFEST
OD_CYLINDER: +1.75
OD_VA2: 20/60(J6)
OS_CYLINDER: SPHERE
OD_AXIS: 060
OD_SPHERE: -2.25
OS_VA1: 20/20
OD_VA1: 20/50-2
OS_SPHERE: PLANO
OS_ADD: +2.25
OD_ADD: +2.25
OS_VA2: 20/20(J1+)

## 2023-10-31 ASSESSMENT — SPHEQUIV_DERIVED
OS_SPHEQUIV: 0.375
OD_SPHEQUIV: -0.875
OD_SPHEQUIV: -1.375

## 2023-10-31 ASSESSMENT — VISUAL ACUITY
OD_BCVA: 20/20-1
OS_BCVA: 20/80-2

## 2024-03-04 ENCOUNTER — OFFICE (OUTPATIENT)
Facility: LOCATION | Age: 59
Setting detail: OPHTHALMOLOGY
End: 2024-03-04
Payer: COMMERCIAL

## 2024-03-04 DIAGNOSIS — H25.13: ICD-10-CM

## 2024-03-04 DIAGNOSIS — H31.29: ICD-10-CM

## 2024-03-04 PROCEDURE — 99213 OFFICE O/P EST LOW 20 MIN: CPT | Performed by: OPHTHALMOLOGY

## 2024-03-04 NOTE — HISTORY OF PRESENT ILLNESS
[FreeTextEntry1] : This 57 y/o with newly diagnosed endometrial cancer is s/p RA TLH BSO, b/l PPASLND, cysto, PW on 2/8/22. She was seen post operatively on 2/23/22 where i discussed Stage 1A grade was vague. I advised the patient that I wanted to present her case at our tumor board meeting to discuss the best treatment plan for patient. TMB recommendation best tx for pt: Adjuvant therapy w/ systemic chemo and vaginal brachytherapy. The patient sought a second opinion, where the pathology was also reviewed. She ultimately chose not to pursue chemotherapy but has now completed radiation. She returns today for a routine svl visit. Denies any pain or VB. Feels well from a gynecological stand point 
difficulty breathing

## 2024-03-05 ASSESSMENT — REFRACTION_MANIFEST
OS_VA2: 20/20(J1+)
OD_VA2: 20/60(J6)
OD_SPHERE: -1.50
OS_SPHERE: PLANO
OD_ADD: +2.25
OD_VA1: 20/25+2
OS_CYLINDER: SPHERE
OS_VA1: 20/20-1
OD_CYLINDER: +1.75
OD_AXIS: 060
OS_ADD: +2.25

## 2024-03-05 ASSESSMENT — REFRACTION_CURRENTRX
OD_SPHERE: +1.50
OS_SPHERE: +1.50
OD_OVR_VA: 20/
OS_OVR_VA: 20/

## 2024-03-05 ASSESSMENT — SPHEQUIV_DERIVED: OD_SPHEQUIV: -0.625

## 2024-03-19 ENCOUNTER — APPOINTMENT (OUTPATIENT)
Dept: RADIATION ONCOLOGY | Facility: CLINIC | Age: 59
End: 2024-03-19
Payer: COMMERCIAL

## 2024-03-19 VITALS
RESPIRATION RATE: 16 BRPM | HEIGHT: 64 IN | OXYGEN SATURATION: 98 % | WEIGHT: 183 LBS | BODY MASS INDEX: 31.24 KG/M2 | HEART RATE: 82 BPM | DIASTOLIC BLOOD PRESSURE: 84 MMHG | SYSTOLIC BLOOD PRESSURE: 142 MMHG

## 2024-03-19 DIAGNOSIS — Z90.710 ACQUIRED ABSENCE OF BOTH CERVIX AND UTERUS: ICD-10-CM

## 2024-03-19 DIAGNOSIS — Z92.3 PERSONAL HISTORY OF IRRADIATION: ICD-10-CM

## 2024-03-19 DIAGNOSIS — C54.1 MALIGNANT NEOPLASM OF ENDOMETRIUM: ICD-10-CM

## 2024-03-19 PROCEDURE — 99213 OFFICE O/P EST LOW 20 MIN: CPT

## 2024-03-20 PROBLEM — C54.1 ENDOMETRIAL ADENOCARCINOMA: Status: ACTIVE | Noted: 2022-01-12

## 2024-03-20 PROBLEM — Z92.3 PERSONAL HISTORY OF RADIATION THERAPY: Status: ACTIVE | Noted: 2022-10-01

## 2024-03-20 PROBLEM — Z90.710 HISTORY OF HYSTERECTOMY FOR CANCER: Status: ACTIVE | Noted: 2023-02-08

## 2024-03-20 NOTE — LETTER GREETING
[Dear Doctor] : Dear Doctor, [Consult Letter:] : Your patient, [unfilled] was seen in my office today for consultation. [FreeTextEntry2] : Laura Horne MD [Please see my note below.] : Please see my note below.

## 2024-03-20 NOTE — HISTORY OF PRESENT ILLNESS
[FreeTextEntry1] : This 58 year-old female presents for routine follow-up. Ms. Tierney is an otherwise healthy woman with a stage one endometrial cancer, mostly grade I, but small foci of grade 2 and 3 as well. She completed radiation brachytherapy (2100cGy/3fx) to the vaginal cuff on 5/4/2022. Chemotherapy had been recommended, but patient opted against after seeking second opinion.  Denies pelvic pain, dysuria, diarrhea. She does report a one-time brownish liquid vaginal discharge that lasted a few hours.  Notes she is sexually active including intercourse.  Discontinued a course of pelvic floor dysfunction rehabilitation since she no longer felt the need for it. Follows with Dr. Lula Horne yearly -- due in June 2024.

## 2024-03-20 NOTE — PHYSICAL EXAM
[Normal] : oriented to person, place and time, the affect was normal, the mood was normal and not anxious [de-identified] : vaginal cuff unremarkable on visual and manual pelvic exam.

## 2024-03-20 NOTE — REVIEW OF SYSTEMS
[Negative] : Allergic/Immunologic [FreeTextEntry8] : s/p radiation therapy to the vaginal cuff for endometrial cancer

## 2024-03-20 NOTE — LETTER CLOSING
[Sincerely yours,] : Sincerely yours, [FreeTextEntry3] : Yogesh Menjivar MD Physician in Chief Department of Radiation Medicine [facility]   of Radiation Medicine [employer] at  [facility]  Radiation  [facility]/ [facility] at [place] [address]  Tel: [phone] Fax: [phone]

## 2024-03-20 NOTE — DISEASE MANAGEMENT
[Clinical] : TNM Stage: c [IA] : IA [FreeTextEntry4] : endometrial cancer [TTNM] : 1a [MTNM] : 0 [NTNM] : 0 [de-identified] : 2050cGy [de-identified] : vaginal cuff via HDR brachytherapy

## 2024-06-20 ENCOUNTER — OFFICE (OUTPATIENT)
Facility: LOCATION | Age: 59
Setting detail: OPHTHALMOLOGY
End: 2024-06-20
Payer: COMMERCIAL

## 2024-06-20 DIAGNOSIS — H10.013: ICD-10-CM

## 2024-06-20 DIAGNOSIS — H25.13: ICD-10-CM

## 2024-06-20 DIAGNOSIS — H31.29: ICD-10-CM

## 2024-06-20 PROCEDURE — 99213 OFFICE O/P EST LOW 20 MIN: CPT | Performed by: OPHTHALMOLOGY

## 2024-06-20 ASSESSMENT — CONFRONTATIONAL VISUAL FIELD TEST (CVF)
OS_FINDINGS: FULL
OD_FINDINGS: FULL

## 2024-09-05 ENCOUNTER — RESULT REVIEW (OUTPATIENT)
Age: 59
End: 2024-09-05

## 2024-09-09 ENCOUNTER — OUTPATIENT (OUTPATIENT)
Dept: OUTPATIENT SERVICES | Facility: HOSPITAL | Age: 59
LOS: 1 days | End: 2024-09-09

## 2024-09-09 ENCOUNTER — APPOINTMENT (OUTPATIENT)
Dept: CT IMAGING | Facility: CLINIC | Age: 59
End: 2024-09-09
Payer: COMMERCIAL

## 2024-09-09 DIAGNOSIS — Z90.710 ACQUIRED ABSENCE OF BOTH CERVIX AND UTERUS: ICD-10-CM

## 2024-09-09 DIAGNOSIS — Z98.890 OTHER SPECIFIED POSTPROCEDURAL STATES: Chronic | ICD-10-CM

## 2024-09-09 PROCEDURE — 74177 CT ABD & PELVIS W/CONTRAST: CPT | Mod: 26

## 2024-09-26 ENCOUNTER — APPOINTMENT (OUTPATIENT)
Dept: RADIATION ONCOLOGY | Facility: CLINIC | Age: 59
End: 2024-09-26

## 2024-11-13 ENCOUNTER — APPOINTMENT (OUTPATIENT)
Dept: RADIATION ONCOLOGY | Facility: CLINIC | Age: 59
End: 2024-11-13
Payer: COMMERCIAL

## 2024-11-13 VITALS
RESPIRATION RATE: 16 BRPM | HEART RATE: 80 BPM | SYSTOLIC BLOOD PRESSURE: 138 MMHG | WEIGHT: 172 LBS | OXYGEN SATURATION: 98 % | BODY MASS INDEX: 29.52 KG/M2 | DIASTOLIC BLOOD PRESSURE: 78 MMHG

## 2024-11-13 DIAGNOSIS — Z92.3 PERSONAL HISTORY OF IRRADIATION: ICD-10-CM

## 2024-11-13 DIAGNOSIS — C54.1 MALIGNANT NEOPLASM OF ENDOMETRIUM: ICD-10-CM

## 2024-11-13 PROCEDURE — 99213 OFFICE O/P EST LOW 20 MIN: CPT

## (undated) DEVICE — SOL IRR POUR H2O 1000ML

## (undated) DEVICE — DRAPE ROBOTIC

## (undated) DEVICE — POSITIONER PINK PAD PIGAZZI SYSTEM

## (undated) DEVICE — SOL INJ NS 0.9% 100ML

## (undated) DEVICE — LIGASURE ATLAS 10MM 20CM

## (undated) DEVICE — WRAP COMPRESSION CALF MED

## (undated) DEVICE — ELCTR GROUNDING PAD ADULT COVIDIEN

## (undated) DEVICE — PACK ROBOTIC

## (undated) DEVICE — DEVICE PUREVIEW ACTIVE

## (undated) DEVICE — XI SEAL UNIV 5- 8 MM

## (undated) DEVICE — RUMI TIP BLUE 6.7MM X 8CM DISP

## (undated) DEVICE — BAG TISSUE RETRIEVAL ROBOTIC STERILE 8MM

## (undated) DEVICE — ELCTR CORD FOOTSWITCH 1PLR LAPSCP 10FT

## (undated) DEVICE — RUMI TIP LAVENDER 5.1MMX6CM DISP

## (undated) DEVICE — GLV 7.5 PROTEXIS

## (undated) DEVICE — RUMI TIP GREEN 6.7MMX10CM DISP

## (undated) DEVICE — LIGASURE BLUNT TIP NANO CTD 37CM

## (undated) DEVICE — FOLEY TRAY 16FR 5CC LF UMETER CLOSED

## (undated) DEVICE — RUMI TIP WHITE 6.7MM X 6CM DISP

## (undated) DEVICE — PREP CHLORAPREP ORANGE 2PCT 26ML

## (undated) DEVICE — PREP BETADINE SPONGE STICKS

## (undated) DEVICE — SUT VLOC 180 0 9" GS-21 GREEN

## (undated) DEVICE — XI DRAPE ARM

## (undated) DEVICE — BALLOON COLPO-PNEUMO OCCLUDER

## (undated) DEVICE — COVER TIP INTUITIVE W INSERTION TOOL

## (undated) DEVICE — DRSG KERLIX ROLL 4.5"

## (undated) DEVICE — SUT VICRYL 4-0 18" PS-2 UNDYED

## (undated) DEVICE — POSITIONER FOAM EGG CRATE ULNAR (2PCS)

## (undated) DEVICE — RUMI TIP ORANGE 6.7MMX12CM DISP

## (undated) DEVICE — DRSG OPSITE POSTOP 2.5"X2"

## (undated) DEVICE — XI DRAPE COLUMN

## (undated) DEVICE — RUMI KOH-EFFICIENT 3.0CM

## (undated) DEVICE — BLANKET WARMER UPPER ADULT

## (undated) DEVICE — XI SEALER DA VINCI VESSEL

## (undated) DEVICE — SOL IRR POUR NS 0.9% 1000ML

## (undated) DEVICE — TROCAR COVIDIEN VERSAONE OPTICAL BLADELESS 5MM